# Patient Record
Sex: FEMALE | Race: WHITE | NOT HISPANIC OR LATINO | Employment: FULL TIME | URBAN - NONMETROPOLITAN AREA
[De-identification: names, ages, dates, MRNs, and addresses within clinical notes are randomized per-mention and may not be internally consistent; named-entity substitution may affect disease eponyms.]

---

## 2017-08-03 ENCOUNTER — HISTORY (OUTPATIENT)
Dept: FAMILY MEDICINE | Facility: OTHER | Age: 37
End: 2017-08-03

## 2017-08-03 ENCOUNTER — OFFICE VISIT - GICH (OUTPATIENT)
Dept: FAMILY MEDICINE | Facility: OTHER | Age: 37
End: 2017-08-03

## 2017-08-03 DIAGNOSIS — N20.0 CALCULUS OF KIDNEY: ICD-10-CM

## 2017-08-03 DIAGNOSIS — Z83.49 FAMILY HISTORY OF OTHER ENDOCRINE, NUTRITIONAL AND METABOLIC DISEASES (CODE): ICD-10-CM

## 2017-08-03 DIAGNOSIS — L30.9 DERMATITIS: ICD-10-CM

## 2017-08-03 DIAGNOSIS — Z00.00 ENCOUNTER FOR GENERAL ADULT MEDICAL EXAMINATION WITHOUT ABNORMAL FINDINGS: ICD-10-CM

## 2017-08-03 DIAGNOSIS — Z83.3 FAMILY HISTORY OF DIABETES MELLITUS: ICD-10-CM

## 2017-08-04 ENCOUNTER — AMBULATORY - GICH (OUTPATIENT)
Dept: LAB | Facility: OTHER | Age: 37
End: 2017-08-04

## 2017-08-04 DIAGNOSIS — Z00.00 ENCOUNTER FOR GENERAL ADULT MEDICAL EXAMINATION WITHOUT ABNORMAL FINDINGS: ICD-10-CM

## 2017-08-04 DIAGNOSIS — Z83.49 FAMILY HISTORY OF OTHER ENDOCRINE, NUTRITIONAL AND METABOLIC DISEASES (CODE): ICD-10-CM

## 2017-08-04 DIAGNOSIS — Z83.3 FAMILY HISTORY OF DIABETES MELLITUS: ICD-10-CM

## 2017-08-04 LAB
ABSOLUTE BASOPHILS - HISTORICAL: 0.1 THOU/CU MM
ABSOLUTE EOSINOPHILS - HISTORICAL: 0.1 THOU/CU MM
ABSOLUTE IMMATURE GRANULOCYTES(METAS,MYELOS,PROS) - HISTORICAL: 0 THOU/CU MM
ABSOLUTE LYMPHOCYTES - HISTORICAL: 1.4 THOU/CU MM (ref 0.9–2.9)
ABSOLUTE MONOCYTES - HISTORICAL: 0.7 THOU/CU MM
ABSOLUTE NEUTROPHILS - HISTORICAL: 5.6 THOU/CU MM (ref 1.7–7)
BASOPHILS # BLD AUTO: 0.6 %
CHOL/HDL RATIO - HISTORICAL: 3.22
CHOLESTEROL TOTAL: 161 MG/DL
EOSINOPHIL NFR BLD AUTO: 1.2 %
ERYTHROCYTE [DISTWIDTH] IN BLOOD BY AUTOMATED COUNT: 12.1 % (ref 11.5–15.5)
GLUCOSE SERPL-MCNC: 90 MG/DL (ref 70–105)
HCT VFR BLD AUTO: 38.7 % (ref 33–51)
HDLC SERPL-MCNC: 50 MG/DL (ref 23–92)
HEMOGLOBIN: 13.2 G/DL (ref 12–16)
IMMATURE GRANULOCYTES(METAS,MYELOS,PROS) - HISTORICAL: 0.3 %
LDLC SERPL CALC-MCNC: 97 MG/DL
LYMPHOCYTES NFR BLD AUTO: 17.5 % (ref 20–44)
MCH RBC QN AUTO: 31.4 PG (ref 26–34)
MCHC RBC AUTO-ENTMCNC: 34.1 G/DL (ref 32–36)
MCV RBC AUTO: 92 FL (ref 80–100)
MONOCYTES NFR BLD AUTO: 8.9 %
NEUTROPHILS NFR BLD AUTO: 71.5 % (ref 42–72)
NON-HDL CHOLESTEROL - HISTORICAL: 111 MG/DL
PATIENT STATUS - HISTORICAL: NORMAL
PLATELET # BLD AUTO: 189 THOU/CU MM (ref 140–440)
PMV BLD: 9.4 FL (ref 6.5–11)
RED BLOOD COUNT - HISTORICAL: 4.2 MIL/CU MM (ref 4–5.2)
TRIGL SERPL-MCNC: 71 MG/DL
WHITE BLOOD COUNT - HISTORICAL: 7.8 THOU/CU MM (ref 4.5–11)

## 2017-08-14 LAB — HPV RESULTS - HISTORICAL: NEGATIVE

## 2017-08-28 ENCOUNTER — OFFICE VISIT - GICH (OUTPATIENT)
Dept: FAMILY MEDICINE | Facility: OTHER | Age: 37
End: 2017-08-28

## 2017-08-28 DIAGNOSIS — Z12.4 ENCOUNTER FOR SCREENING FOR MALIGNANT NEOPLASM OF CERVIX: ICD-10-CM

## 2017-12-28 NOTE — PROGRESS NOTES
Patient Information     Patient Name MRN Sex Pari Calhoun [id number] Female 1980      Progress Notes by Aurora Osman MD at 2017 11:30 AM     Author:  Aurora Osman MD Service:  (none) Author Type:  Physician     Filed:  2017 12:38 PM Encounter Date:  2017 Status:  Signed     :  Aurora Osman MD (Physician)            Nursing Notes:   Nataliia Byrd  2017 12:00 PM  Unsigned  Pap ONLY - no charge -    Nataliia Byrd LPN........................2017  12:00 PM       Patient is here for Pap only. She recently had Pap smear unfortunately unsatisfactory specimen. HPV came back negative. He denies any changes she is a G2 para 2002 LMP no 2017.    LMP 2017 (Approximate)   General Appearance: Normal., Pleasant, alert, appropriate appearance for age. No acute distress,     Genitourinary Exam Female:   External Genitalia: normal hair distribution, EG/BUS  Vaginal exam: normal pink mucosa without prolapse or lesions  Cervix: normal appearance without lesions or other abnormalities  Pap obtained after copious amounts of mucous obtained.       1. Screening for cervical cancer   discussed with patient need for repeat pap   Briefly reviewed previous normal pap     Results for orders placed or performed in visit on 17      LIPID PANEL      Result  Value Ref Range    CHOLESTEROL,TOTAL 161 <200 mg/dL    TRIGLYCERIDES 71 <150 mg/dL    HDL CHOLESTEROL 50 23 - 92 mg/dL    NON-HDL CHOLESTEROL 111 <145 mg/dl    CHOL/HDL RATIO            3.22 <4.50                    LDL CHOLESTEROL 97 <100 mg/dL    PATIENT STATUS            FASTING                   GLUCOSE, FASTING      Result  Value Ref Range    GLUCOSE 90 70 - 105 mg/dL   CBC WITH AUTO DIFFERENTIAL      Result  Value Ref Range    WHITE BLOOD COUNT         7.8 4.5 - 11.0 thou/cu mm    RED BLOOD COUNT           4.20 4.00 - 5.20 mil/cu mm    HEMOGLOBIN                13.2 12.0 - 16.0 g/dL     HEMATOCRIT                38.7 33.0 - 51.0 %    MCV                       92 80 - 100 fL    MCH                       31.4 26.0 - 34.0 pg    MCHC                      34.1 32.0 - 36.0 g/dL    RDW                       12.1 11.5 - 15.5 %    PLATELET COUNT            189 140 - 440 thou/cu mm    MPV                       9.4 6.5 - 11.0 fL    NEUTROPHILS               71.5 42.0 - 72.0 %    LYMPHOCYTES               17.5 (L) 20.0 - 44.0 %    MONOCYTES                 8.9 <12.0 %    EOSINOPHILS               1.2 <8.0 %    BASOPHILS                 0.6 <3.0 %    IMMATURE GRANULOCYTES(METAS,MYELOS,PROS) 0.3 %    ABSOLUTE NEUTROPHILS      5.6 1.7 - 7.0 thou/cu mm    ABSOLUTE LYMPHOCYTES      1.4 0.9 - 2.9 thou/cu mm    ABSOLUTE MONOCYTES        0.7 <0.9 thou/cu mm    ABSOLUTE EOSINOPHILS      0.1 <0.5 thou/cu mm    ABSOLUTE BASOPHILS        0.1 <0.3 thou/cu mm    ABSOLUTE IMMATURE GRANULOCYTES(METAS,MYELOS,PROS) 0.0 <=0.3 thou/cu mm       - GYN THIN PREP PAP SCREEN IMAGED; Future

## 2017-12-28 NOTE — PATIENT INSTRUCTIONS
Patient Information     Patient Name MRN Sex Pari Calhoun [id number] Female 1980      Patient Instructions by Aurora Osman MD at 8/3/2017  2:21 PM     Author:  Aurora Osman MD  Service:  (none) Author Type:  Physician     Filed:  8/3/2017  7:43 PM  Encounter Date:  8/3/2017 Status:  Addendum     :  Aurroa Osman MD (Physician)        Related Notes: Original Note by Aurora Osman MD (Physician) filed at 8/3/2017  2:54 PM            Car Safety tips:   Wear your seatbelt at all times.   Do not drive when tired.  If drinking alcohol, find a designated .  Do NOT EVER text and drive!   Consider [employer] marium for your phone.       Labs will be mailed to your home.   Work on following  a mediterranean diet; Use monosaturated fats ( olive , canola and peanut   oil; nuts, avocados), abundance of plant foods which are minimally processed, fish (salmon).  Exercise 30 minutes every day     Try to get 7-8 hours sleep each night.  Rest is important!      Recommendations for females ages 18-40 years old:    Maintaining a few health-related habits can have a huge impact on your future health. Please consider the following general health recommendations:     Eat a quality diet (generally, low in simple sugars, starches, cholesterol and saturated fat.)    If your diet contains less than 4 servings of dairy products each day, take a Calcium 600mg/Vit D 200IU tablet twice daily to help maintain your bone strength.    If you are considering pregnancy begin a supplemental vitamin with 1 mg of folic acid daily.  Avoid alcohol when you think you might have conceived, as disability related to the fetal alcohol syndrome can occur with as few as 1 to 2 drinks if consumed at a critical time in your baby's development.    Never smoke. Avoid chewing tobacco.    Limit alcohol to no more than 1-2 in 24 hours, as higher use increases your weight, can damage your liver or  pancreas, and increases triglycerides (fat in the blood). Never drink and drive.    Avoid the use of recreational drugs.  Methamphetamine, for example, often causes addiction with the first use.    Exercise regularly.  Ideally you would have 30-60 minutes of aerobic exercise at least 4 times weekly.  Find something you enjoy and a friend to do it with you.    Maintain ideal weight.  Body mass index is 20.43 kg/(m^2).  Generally a BMI of 20-25 is considered ideal. Overweight is defined as 25-30, Obese is 30-35 and markedly obese is greater than 35.      Apply sun block (SPF 25 or greater) on exposed skin anytime you are out in the sun to prevent skin cancer.      Wear a seatbelt whenever you are in a car.    You should have a tetanus booster at least every 10 years.  Consider a flu shot every fall.    Immunization History     Administered  Date(s) Administered     Hepatitis A (Adult) 01/02/2012, 08/17/2012     Td (Age >=7 Years) 06/10/2002     Tdap 03/12/2012       Your cholesterol should be checked annually if high, and once every 5 years if normal.    You should have a pap  every 2 years between ages 21-30 and every 3 years between the ages of 30 and 70 unless you have had previous abnormal pap smear, (in these cases the exams and PAP's should be done yearly). If you have had hysterectomy in the past, your future Pap plan may be different.     Consider testing for sexually transmitted disease (STD) if you have had more than 2 partners in the last 5 years or have any other reason to believe you are at risk of infection.  The best way to minimize your risk of STD is to limit the number of partners you have and know their sexual history. Using condoms decreases your risk, but does not always prevent STD.      Index Related topics   Dermatitis: Atopic (Eczema)   ________________________________________________________________________  KEY POINTS    Dermatitis is irritation and swelling of the skin. Atopic means that  you have inherited the risk for allergic skin conditions. Atopic dermatitis may also be called eczema.    You may need ointment for the skin or medicine by mouth to treat the itching or inflammation.    Avoid the things that you know will make your skin rash worse, such as wearing tight or scratchy clothing, or certain foods or medicines.  ________________________________________________________________________  What is atopic dermatitis?  Dermatitis is irritation and swelling (inflammation) of the skin. Atopic means that you have inherited the risk for allergic conditions such as skin rashes, hay fever, and asthma.  Atopic dermatitis may also be called eczema. Eczema is a common skin problem that usually starts in childhood or early adulthood. It s a chronic disease, which means you will likely have it all of your life.  What is the cause?  The immune system is your body s defense against infection. When you have atopic dermatitis, your immune system may react when you are exposed to certain things such as:    Allergies, for example to a food or medicine    Hot baths or showers    Soap    Scratchy or tight clothing    Quick temperature or humidity changes    Stress  Eczema may always be present or may flare-up only in certain seasons. It often gets worse in the winter when indoor air can be very dry.  An allergy to dust mites may make eczema worse. Dust mites are very tiny bugs that you cannot see without a microscope. They live in mattresses, pillows, carpet, and upholstered furniture.  What are the symptoms?  Most people with eczema have a mild form. Symptoms may include:    Itching    Dryness    Fine scales or flaking    Redness  Any area of skin may be affected, but the most common areas are:    Behind the knees    The inside of the elbows    On the side of the neck    Around the eyes and ears  Severe eczema causes intense itching. The skin is usually very sensitive to being touched. Even a light touch, like  when your hair blows across your face, may cause itching. Many people who have severe eczema are quite sensitive to scratchy fabrics, especially wool.  How is it diagnosed?  Your healthcare provider will ask about your symptoms and medical history and examine your skin.  Your provider may recommend testing for an allergy to dust mites.  How is it treated?  Treatment cannot cure eczema, but it can help lessen your symptoms and prevent eczema from getting worse. It may help to use a moisturizer. Or it may go away if you put 1% hydrocortisone cream on the area up to 4 times a day. No prescription is needed for this cream.  For more severe eczema, your healthcare provider may prescribe:    Cream or ointment to stop the itching and other symptoms    Antihistamine pills to help stop itching and any allergic reaction. Do not put antihistamine creams or lotions on your skin if you are taking antihistamine pills.    Anti-inflammatory medicine, such as prednisone, if your symptoms are severe  If tests show that you are allergic to dust mites, your provider may recommend that you try to get rid of any dust mites in your home. In some cases, allergy shots for dust mites may be helpful.  Your healthcare provider will recommend how often you should bathe or shower and which soaps and moisturizers you should use. This helps keep your skin from getting too dry.  How can I take care of myself?  Follow the full course of treatment prescribed by your healthcare provider. In addition:    Avoid scratching your skin even though it itches. Scratching may break the skin and cause infection.    Take short baths or warm showers no more than once a day. Use a mild moisturizing soap or nonsoap cleanser. Avoid long, hot baths. Hot water can increase itching. Pat your skin dry with a soft towel. Do not rub your skin dry. Use moisturizing cream or lotion when your skin is still a little wet.    Use unscented moisturizing creams or ointments,  rather than water-based lotions. Moisturize your skin regularly, several times a day, if possible. Ask your healthcare provider or pharmacist for recommendations.    Avoid the things that you know will make your skin rash worse, such as wearing tight or scratchy clothing, or certain foods or medicines.    Choose soft cotton fabrics for your clothing.    Avoid humidity and sudden changes in temperature when possible.    Avoid getting too hot (over-heating).    Wash clothes and bedding in mild soap and hot water. Rinse your laundry twice to get rid of all the soap. Avoid fabric softener liquids and dryer sheets.    Try to get rid of dust mites in your home. Use anti-allergy covers on bed pillows and mattresses. Wash bedding every week or two. You may want to get rid of [id number] carpets and draperies that catch dust and cannot be damp-wiped or laundered.    Learn to manage stress. Ask for help at home and work when the load is too great to handle. Find ways to relax. For example take up a hobby, listen to music, watch movies, or take walks. Try yoga, meditation, or deep breathing exercises when you feel stressed.  Ask your provider:    How long it will take to recover    If there are activities you should avoid and when you can return to your normal activities    How to take care of yourself at home    What symptoms or problems you should watch for and what to do if you have them  Make sure you know when you should come back for a checkup. Keep all appointments for provider visits or tests.  You can get more information from:    National Eczema Association  237.250.7397  http://nationaleczema.org  Developed by [employer].  Adult Advisor 2016.3 published by [employer].  Last modified: 2016-05-03  Last reviewed: 2015-08-31  This content is reviewed periodically and is subject to change as new health information becomes available. The information is intended to inform and educate and is not a replacement for medical  evaluation, advice, diagnosis or treatment by a healthcare professional.  References   Adult Advisor 2016.3 Index    Copyright   2016 [employer], a division of [employer] All rights reserved.

## 2017-12-28 NOTE — PROGRESS NOTES
Patient Information     Patient Name MRN Sex     Pari Reyes [id number] Female 1980      Progress Notes by Aurora Osman MD at 8/3/2017  1:00 PM     Author:  Aurora Osman MD Service:  (none) Author Type:  Physician     Filed:  8/3/2017  7:45 PM Encounter Date:  8/3/2017 Status:  Signed     :  Aurora Osman MD (Physician)             There are no exam notes on file for this visit.  SUBJECTIVE:    Pari Reyes is a 36 y.o. female who presents for annual physical examination.     HPI: Patient is a  2 Para 2002 both vaginal deliveries 19 and 16 years ago.  Here for annual GYN examination Patient's last menstrual period was 2017 (approximate)..  Periods are normal.   She is in a monogamous relationship.  Declines sexually transmitted disease testing.  No vaginal complaints.  No breast complaints.   No family history of breast or ovarian cancer.   Last pap .  No history of abnormal pap     Family history diabetes/ high cholesterol  patient has not had her blood sugar cholesterol check. Her dad had high cholesterol and diabetes. He was type II diabetic and had insulin later in life.  She denies any excessive thirst hunger or urination no chest pain or palpitations no changes in skin or hair no nausea vomiting diarrhea.      Wt Readings from Last 3 Encounters:    17 60.5 kg (133 lb 6.4 oz)   13 61.7 kg (136 lb 2 oz)   12 62.6 kg (138 lb)          ALLERGIES:  Review of patient's allergies indicates no known allergies.     Immunization History:  Immunization History     Administered  Date(s) Administered     Hepatitis A (Adult) 2012, 2012     Td (Age >=7 Years) 06/10/2002     Tdap 2012        CURRENT MEDICATIONS:   Current Outpatient Prescriptions       Medication  Sig Dispense Refill     triamcinolone (ARISTOCORT; KENALOG) 0.1 % cream Apply  topically to affected area(s) 3 times daily. 30 g 1 Tube 0     No current  facility-administered medications for this visit.      Medications have been reviewed by me and are current to the best of my knowledge and ability.    PROBLEM LIST:  Patient Active Problem List     Diagnosis  Code     ACNE VULGARIS L70.8       PAST MEDICAL HISTORY:  Past Medical History:     Diagnosis  Date     Nephrolithiasis 2007    passed spontaneously, calcium stone       (spontaneous vaginal delivery)     x2      SURGICAL HISTORY:  Past Surgical History:      Procedure  Laterality Date     ESWL      Nephrolithiasis, subsequent stent removal       History    Smoking Status      Never Smoker   Smokeless Tobacco      Never Used       SOCIAL HISTORY:  Social History     Social History        Marital status:       Spouse name: Nico      Number of children:  2     Years of education:  N/A     Occupational History      Not on file.     Social History Main Topics         Smoking status:   Never Smoker     Smokeless tobacco:   Never Used     Alcohol use   No     Drug use:   No     Sexual activity:   Yes     Partners:  Male     Birth control/ protection:  Surgical      Comment: ; vas      Other Topics  Concern      Service No     Blood Transfusions No     Caffeine Concern No     Occupational Exposure No     Hobby Hazards No     Sleep Concern No     Stress Concern No     Weight Concern No     Special Diet No     Back Care No     Exercise No     Bike Helmet No     Seat Belt Yes     Self-Exams Yes     Social History Narrative     Patient is  with two daughters at home.     Working at [employer].      employed at [email or web address].        Lopze Pedro Father    Owen Vasquez Mother    Nico Reyes Spouse    Ling Reyes Daughter    Mel Reyes Daughter        Preload  2013             FAMILY HISTORY:  Family History       Problem   Relation Age of Onset     Hypertension  Mother      Hyperlipidemia  Mother      hyperlipidemia       Cancer  Mother 49     intraductal carcinoma  "in situ        Other  Mother      osteopenia       Diabetes  Father      mid 50s       Heart Disease  Father 58     mild MI       Hypertension  Maternal Grandfather      Diabetes  Maternal Grandfather      Other  Maternal Grandfather      dementia       Other  Maternal Grandfather      gout       Osteoporosis  Other      maternal great aunt         REVIEW OF SYSTEMS:    ROS: No TIA's or unusual headaches, no dysphagia.  No prolonged cough. No dyspnea or chest pain on exertion.  No abdominal pain, change in bowel habits, black or bloody stools.  No urinary tract symptoms.  No new or unusual musculoskeletal symptoms.  Normal menses, no abnormal vaginal bleeding, discharge or unexpected pelvic pain. No new breast lumps, breast pain or nipple discharge.      EXAM:   /60  Pulse 72  Ht 1.721 m (5' 7.75\")  Wt 60.5 kg (133 lb 6.4 oz)  LMP 07/22/2017 (Approximate)  BMI 20.43 kg/m2       General Appearance: Normal., Pleasant, alert, appropriate appearance for age. No acute distress,  Psych-alert, oriented, and appropriate affect  HEENT-within normal limits   Neck Exam: Normal., Supple, no masses or nodes.  Thyroid Exam: No nodules or enlargement., normal to palpation  Lungs:  Clear to auscultation.  Cardiovascular Exam: Regular rate and rhythm. S1, S2, no murmur, click, gallop, or rubs.  Breast Exam: Normal., No dimpling, nipple retraction or discharge. No masses or nodes. Self breast exam reviewed and taught  Gastrointestinal Exam: Soft, nontender, no abnormal masses or organomegaly.    Genitourinary Exam Female:   External Genitalia: normal hair distribution, EG/BUS  Vaginal exam: normal pink mucosa without prolapse or lesions  Cervix: normal appearance without lesions or other abnormalities;  Pap obtained ; small amount of bleeding.   BME: normal size uterus, no adnexal masses.  Skin: no concerning moles, rashes, or lesions  Extremities:  NT, no edema           ASSESSMENT/PLAN    ICD-10-CM    1. Routine physical " examination Z00.00 GYN THIN PREP PAP SCREEN IMAGED      CBC WITH DIFFERENTIAL      GYN THIN PREP PAP SCREEN IMAGED   2. Eczema, unspecified type L30.9 triamcinolone (ARISTOCORT; KENALOG) 0.1 % cream   3. Family history of diabetes mellitus Z83.3 CBC WITH DIFFERENTIAL   4. Family history of hyperlipidemia Z83.49 LIPID PANEL      GLUCOSE, FASTING   5. Nephrolithiasis N20.0      Ms. Amy's Body mass index is 20.43 kg/(m^2). This is within the normal range for a 36 y.o. Normal range for ages 18+ is between 18.5 and 24.9.         Patient Instructions     Car Safety tips:   Wear your seatbelt at all times.   Do not drive when tired.  If drinking alcohol, find a designated .  Do NOT EVER text and drive!   Consider DRIVE MODE marium for your phone.       Labs will be mailed to your home.   Work on following  a mediterranean diet; Use monosaturated fats ( olive , canola and peanut   oil; nuts, avocados), abundance of plant foods which are minimally processed, fish (salmon).  Exercise 30 minutes every day     Try to get 7-8 hours sleep each night.  Rest is important!      Recommendations for females ages 18-40 years old:    Maintaining a few health-related habits can have a huge impact on your future health. Please consider the following general health recommendations:     Eat a quality diet (generally, low in simple sugars, starches, cholesterol and saturated fat.)    If your diet contains less than 4 servings of dairy products each day, take a Calcium 600mg/Vit D 200IU tablet twice daily to help maintain your bone strength.    If you are considering pregnancy begin a supplemental vitamin with 1 mg of folic acid daily.  Avoid alcohol when you think you might have conceived, as disability related to the fetal alcohol syndrome can occur with as few as 1 to 2 drinks if consumed at a critical time in your baby's development.    Never smoke. Avoid chewing tobacco.    Limit alcohol to no more than 1-2 in 24 hours, as higher  use increases your weight, can damage your liver or pancreas, and increases triglycerides (fat in the blood). Never drink and drive.    Avoid the use of recreational drugs.  Methamphetamine, for example, often causes addiction with the first use.    Exercise regularly.  Ideally you would have 30-60 minutes of aerobic exercise at least 4 times weekly.  Find something you enjoy and a friend to do it with you.    Maintain ideal weight.  Body mass index is 20.43 kg/(m^2).  Generally a BMI of 20-25 is considered ideal. Overweight is defined as 25-30, Obese is 30-35 and markedly obese is greater than 35.      Apply sun block (SPF 25 or greater) on exposed skin anytime you are out in the sun to prevent skin cancer.      Wear a seatbelt whenever you are in a car.    You should have a tetanus booster at least every 10 years.  Consider a flu shot every fall.    Immunization History     Administered  Date(s) Administered     Hepatitis A (Adult) 01/02/2012, 08/17/2012     Td (Age >=7 Years) 06/10/2002     Tdap 03/12/2012       Your cholesterol should be checked annually if high, and once every 5 years if normal.    You should have a pap  every 2 years between ages 21-30 and every 3 years between the ages of 30 and 70 unless you have had previous abnormal pap smear, (in these cases the exams and PAP's should be done yearly). If you have had hysterectomy in the past, your future Pap plan may be different.     Consider testing for sexually transmitted disease (STD) if you have had more than 2 partners in the last 5 years or have any other reason to believe you are at risk of infection.  The best way to minimize your risk of STD is to limit the number of partners you have and know their sexual history. Using condoms decreases your risk, but does not always prevent STD.      Index Related topics   Dermatitis: Atopic (Eczema)   ________________________________________________________________________  KEY POINTS    Dermatitis is  irritation and swelling of the skin. Atopic means that you have inherited the risk for allergic skin conditions. Atopic dermatitis may also be called eczema.    You may need ointment for the skin or medicine by mouth to treat the itching or inflammation.    Avoid the things that you know will make your skin rash worse, such as wearing tight or scratchy clothing, or certain foods or medicines.  ________________________________________________________________________  What is atopic dermatitis?  Dermatitis is irritation and swelling (inflammation) of the skin. Atopic means that you have inherited the risk for allergic conditions such as skin rashes, hay fever, and asthma.  Atopic dermatitis may also be called eczema. Eczema is a common skin problem that usually starts in childhood or early adulthood. It s a chronic disease, which means you will likely have it all of your life.  What is the cause?  The immune system is your body s defense against infection. When you have atopic dermatitis, your immune system may react when you are exposed to certain things such as:    Allergies, for example to a food or medicine    Hot baths or showers    Soap    Scratchy or tight clothing    Quick temperature or humidity changes    Stress  Eczema may always be present or may flare-up only in certain seasons. It often gets worse in the winter when indoor air can be very dry.  An allergy to dust mites may make eczema worse. Dust mites are very tiny bugs that you cannot see without a microscope. They live in mattresses, pillows, carpet, and upholstered furniture.  What are the symptoms?  Most people with eczema have a mild form. Symptoms may include:    Itching    Dryness    Fine scales or flaking    Redness  Any area of skin may be affected, but the most common areas are:    Behind the knees    The inside of the elbows    On the side of the neck    Around the eyes and ears  Severe eczema causes intense itching. The skin is usually very  sensitive to being touched. Even a light touch, like when your hair blows across your face, may cause itching. Many people who have severe eczema are quite sensitive to scratchy fabrics, especially wool.  How is it diagnosed?  Your healthcare provider will ask about your symptoms and medical history and examine your skin.  Your provider may recommend testing for an allergy to dust mites.  How is it treated?  Treatment cannot cure eczema, but it can help lessen your symptoms and prevent eczema from getting worse. It may help to use a moisturizer. Or it may go away if you put 1% hydrocortisone cream on the area up to 4 times a day. No prescription is needed for this cream.  For more severe eczema, your healthcare provider may prescribe:    Cream or ointment to stop the itching and other symptoms    Antihistamine pills to help stop itching and any allergic reaction. Do not put antihistamine creams or lotions on your skin if you are taking antihistamine pills.    Anti-inflammatory medicine, such as prednisone, if your symptoms are severe  If tests show that you are allergic to dust mites, your provider may recommend that you try to get rid of any dust mites in your home. In some cases, allergy shots for dust mites may be helpful.  Your healthcare provider will recommend how often you should bathe or shower and which soaps and moisturizers you should use. This helps keep your skin from getting too dry.  How can I take care of myself?  Follow the full course of treatment prescribed by your healthcare provider. In addition:    Avoid scratching your skin even though it itches. Scratching may break the skin and cause infection.    Take short baths or warm showers no more than once a day. Use a mild moisturizing soap or nonsoap cleanser. Avoid long, hot baths. Hot water can increase itching. Pat your skin dry with a soft towel. Do not rub your skin dry. Use moisturizing cream or lotion when your skin is still a little  wet.    Use unscented moisturizing creams or ointments, rather than water-based lotions. Moisturize your skin regularly, several times a day, if possible. Ask your healthcare provider or pharmacist for recommendations.    Avoid the things that you know will make your skin rash worse, such as wearing tight or scratchy clothing, or certain foods or medicines.    Choose soft cotton fabrics for your clothing.    Avoid humidity and sudden changes in temperature when possible.    Avoid getting too hot (over-heating).    Wash clothes and bedding in mild soap and hot water. Rinse your laundry twice to get rid of all the soap. Avoid fabric softener liquids and dryer sheets.    Try to get rid of dust mites in your home. Use anti-allergy covers on bed pillows and mattresses. Wash bedding every week or two. You may want to get rid of [id number] carpets and draperies that catch dust and cannot be damp-wiped or laundered.    Learn to manage stress. Ask for help at home and work when the load is too great to handle. Find ways to relax. For example take up a hobby, listen to music, watch movies, or take walks. Try yoga, meditation, or deep breathing exercises when you feel stressed.  Ask your provider:    How long it will take to recover    If there are activities you should avoid and when you can return to your normal activities    How to take care of yourself at home    What symptoms or problems you should watch for and what to do if you have them  Make sure you know when you should come back for a checkup. Keep all appointments for provider visits or tests.  You can get more information from:    National Eczema Association  716.392.1930  http://nationaleczema.org  Developed by [employer].  Adult Advisor 2016.3 published by [employer].  Last modified: 2016-05-03  Last reviewed: 2015-08-31  This content is reviewed periodically and is subject to change as new health information becomes available. The information is intended to  inform and educate and is not a replacement for medical evaluation, advice, diagnosis or treatment by a healthcare professional.  References   Adult Advisor 2016.3 Index    Copyright   2016 [employer], a division of [employer] All rights reserved.

## 2017-12-28 NOTE — ADDENDUM NOTE
Patient Information     Patient Name MRN Pari Freire [id number] Female 1980      Addendum Note by Destiny Esquivel at 8/10/2017  8:47 AM     Author:  Destiny Esquivel Service:  (none) Author Type:  (none)     Filed:  8/10/2017  8:47 AM Encounter Date:  8/3/2017 Status:  Signed     :  Destiny Esquivel       Addended by: DESTINY ESQUIVEL on: 8/10/2017 08:47 AM        Modules accepted: Orders

## 2017-12-29 NOTE — PATIENT INSTRUCTIONS
Patient Information     Patient Name MRN Sex Pari Calhoun [id number] Female 1980      Patient Instructions by Aurora Osman MD at 2017 11:30 AM     Author:  Aurora Osman MD Service:  (none) Author Type:  Physician     Filed:  2017  9:19 AM Encounter Date:  2017 Status:  Signed     :  Aurora Osman MD (Physician)            Car Safety tips:   Wear your seatbelt at all times.   Do not drive when tired.  If drinking alcohol, find a designated .  Do NOT EVER text and drive!   Consider DRIVE MODE marium for your phone.       Results for orders placed or performed in visit on 17      LIPID PANEL      Result  Value Ref Range    CHOLESTEROL,TOTAL 161 <200 mg/dL    TRIGLYCERIDES 71 <150 mg/dL    HDL CHOLESTEROL 50 23 - 92 mg/dL    NON-HDL CHOLESTEROL 111 <145 mg/dl    CHOL/HDL RATIO            3.22 <4.50                    LDL CHOLESTEROL 97 <100 mg/dL    PATIENT STATUS            FASTING                   GLUCOSE, FASTING      Result  Value Ref Range    GLUCOSE 90 70 - 105 mg/dL   CBC WITH AUTO DIFFERENTIAL      Result  Value Ref Range    WHITE BLOOD COUNT         7.8 4.5 - 11.0 thou/cu mm    RED BLOOD COUNT           4.20 4.00 - 5.20 mil/cu mm    HEMOGLOBIN                13.2 12.0 - 16.0 g/dL    HEMATOCRIT                38.7 33.0 - 51.0 %    MCV                       92 80 - 100 fL    MCH                       31.4 26.0 - 34.0 pg    MCHC                      34.1 32.0 - 36.0 g/dL    RDW                       12.1 11.5 - 15.5 %    PLATELET COUNT            189 140 - 440 thou/cu mm    MPV                       9.4 6.5 - 11.0 fL    NEUTROPHILS               71.5 42.0 - 72.0 %    LYMPHOCYTES               17.5 (L) 20.0 - 44.0 %    MONOCYTES                 8.9 <12.0 %    EOSINOPHILS               1.2 <8.0 %    BASOPHILS                 0.6 <3.0 %    IMMATURE GRANULOCYTES(METAS,MYELOS,PROS) 0.3 %    ABSOLUTE NEUTROPHILS      5.6 1.7 - 7.0 thou/cu  mm    ABSOLUTE LYMPHOCYTES      1.4 0.9 - 2.9 thou/cu mm    ABSOLUTE MONOCYTES        0.7 <0.9 thou/cu mm    ABSOLUTE EOSINOPHILS      0.1 <0.5 thou/cu mm    ABSOLUTE BASOPHILS        0.1 <0.3 thou/cu mm    ABSOLUTE IMMATURE GRANULOCYTES(METAS,MYELOS,PROS) 0.0 <=0.3 thou/cu mm

## 2017-12-30 NOTE — NURSING NOTE
Patient Information     Patient Name MRN Sex Pari Calhoun [id number] Female 1980      Nursing Note by Nataliia Byrd at 2017 11:30 AM     Author:  Nataliia Byrd Service:  (none) Author Type:  (none)     Filed:  2017 12:38 PM Encounter Date:  2017 Status:  Signed     :  Nataliia Byrd            Pap ONLY - no charge -    Nataliia Byrd LPN........................2017  12:00 PM

## 2018-01-25 VITALS
WEIGHT: 133.4 LBS | DIASTOLIC BLOOD PRESSURE: 60 MMHG | HEIGHT: 68 IN | HEART RATE: 72 BPM | BODY MASS INDEX: 20.22 KG/M2 | SYSTOLIC BLOOD PRESSURE: 108 MMHG

## 2018-02-01 ENCOUNTER — DOCUMENTATION ONLY (OUTPATIENT)
Dept: FAMILY MEDICINE | Facility: OTHER | Age: 38
End: 2018-02-01

## 2018-02-01 PROBLEM — L70.8 OTHER ACNE: Status: ACTIVE | Noted: 2018-02-01

## 2018-02-01 RX ORDER — TRIAMCINOLONE ACETONIDE 1 MG/G
CREAM TOPICAL
COMMUNITY
Start: 2017-08-03 | End: 2024-05-02

## 2018-07-23 NOTE — PROGRESS NOTES
Patient Information     Patient Name  Pari Reyes MRN  [id number] Sex  Female   1980      Letter by Aurora Osman MD at      Author:  Aurora Osman MD Service:  (none) Author Type:  (none)    Filed:   Encounter Date:  2017 Status:  (Other)           Pari Reyes  [address] [facility] [address]          August 10, 2017    Dear Ms. Reyes:    Following are the tests completed during your last clinic visit.  The results of these tests are normal and require no further attention unless otherwise noted.  Unfortunately, the pathology department read the pap smear as an 'inadequate ' specimen as likely due to mucous did not contain enough cells.  Therefore, at your convenience and no charge to you, other than pathology test, you will need to return to clinic for a pap only test.      Results for orders placed or performed in visit on 17      LIPID PANEL      Result  Value Ref Range    CHOLESTEROL,TOTAL 161 <200 mg/dL    TRIGLYCERIDES 71 <150 mg/dL    HDL CHOLESTEROL 50 23 - 92 mg/dL    NON-HDL CHOLESTEROL 111 <145 mg/dl    CHOL/HDL RATIO            3.22 <4.50                    LDL CHOLESTEROL 97 <100 mg/dL    PATIENT STATUS            FASTING                   GLUCOSE, FASTING      Result  Value Ref Range    GLUCOSE 90 70 - 105 mg/dL   CBC WITH AUTO DIFFERENTIAL      Result  Value Ref Range    WHITE BLOOD COUNT         7.8 4.5 - 11.0 thou/cu mm    RED BLOOD COUNT           4.20 4.00 - 5.20 mil/cu mm    HEMOGLOBIN                13.2 12.0 - 16.0 g/dL    HEMATOCRIT                38.7 33.0 - 51.0 %    MCV                       92 80 - 100 fL    MCH                       31.4 26.0 - 34.0 pg    MCHC                      34.1 32.0 - 36.0 g/dL    RDW                       12.1 11.5 - 15.5 %    PLATELET COUNT            189 140 - 440 thou/cu mm    MPV                       9.4 6.5 - 11.0 fL    NEUTROPHILS               71.5 42.0 - 72.0 %    LYMPHOCYTES               17.5 (L) 20.0  - 44.0 %    MONOCYTES                 8.9 <12.0 %    EOSINOPHILS               1.2 <8.0 %    BASOPHILS                 0.6 <3.0 %    IMMATURE GRANULOCYTES(METAS,MYELOS,PROS) 0.3 %    ABSOLUTE NEUTROPHILS      5.6 1.7 - 7.0 thou/cu mm    ABSOLUTE LYMPHOCYTES      1.4 0.9 - 2.9 thou/cu mm    ABSOLUTE MONOCYTES        0.7 <0.9 thou/cu mm    ABSOLUTE EOSINOPHILS      0.1 <0.5 thou/cu mm    ABSOLUTE BASOPHILS        0.1 <0.3 thou/cu mm    ABSOLUTE IMMATURE GRANULOCYTES(METAS,MYELOS,PROS) 0.0 <=0.3 thou/cu mm          If you have any further questions or problems contact my office at the above number.  I trust this finds you in good health and spirits.      Sincerely,         Electronically signed by Aurora Osman MD

## 2018-07-23 NOTE — PROGRESS NOTES
Patient Information     Patient Name  Pari Reyes MRN  [id number] Sex  Female   1980      Letter by Aurora Osman MD at      Author:  Aurora Osman MD Service:  (none) Author Type:  (none)    Filed:   Encounter Date:  2017 Status:  (Other)           Pari Reyes  [address] [facility] [address]          2017    Dear Ms. Reyes:    The result from the Pap test(s) you had done at your recent clinic visit came back as normal.     We recommend that you have an adult physical exam each year. Depending on your Pap test history, you may or may not need a Pap test at these visits.  You and your health care provider will decide what is right for you.    If you have any further questions or concerns, please call [phone]. You may also contact us by using medical messaging if you have MyChart.    Thank you for choosing [facility] to participate in your healthcare needs.    Sincerely,    Dr. Osman        The [place] Screening Program is a statewide comprehensive breast and cervical cancer screening program that provides free screening and follow-up services (including colposcopy) to uninsured and underinsured women. For more information call toll free [phone] ([phone])

## 2019-08-23 ENCOUNTER — OFFICE VISIT (OUTPATIENT)
Dept: FAMILY MEDICINE | Facility: OTHER | Age: 39
End: 2019-08-23
Attending: NURSE PRACTITIONER
Payer: COMMERCIAL

## 2019-08-23 VITALS
RESPIRATION RATE: 16 BRPM | SYSTOLIC BLOOD PRESSURE: 100 MMHG | DIASTOLIC BLOOD PRESSURE: 58 MMHG | TEMPERATURE: 98.2 F | HEART RATE: 84 BPM | BODY MASS INDEX: 19.55 KG/M2 | HEIGHT: 68 IN | WEIGHT: 129 LBS

## 2019-08-23 DIAGNOSIS — Z00.00 ROUTINE GENERAL MEDICAL EXAMINATION AT A HEALTH CARE FACILITY: Primary | ICD-10-CM

## 2019-08-23 LAB
ANION GAP SERPL CALCULATED.3IONS-SCNC: 4 MMOL/L (ref 3–14)
BUN SERPL-MCNC: 17 MG/DL (ref 7–25)
CALCIUM SERPL-MCNC: 9 MG/DL (ref 8.6–10.3)
CHLORIDE SERPL-SCNC: 105 MMOL/L (ref 98–107)
CHOLEST SERPL-MCNC: 160 MG/DL
CO2 SERPL-SCNC: 27 MMOL/L (ref 21–31)
CREAT SERPL-MCNC: 0.9 MG/DL (ref 0.6–1.2)
ERYTHROCYTE [DISTWIDTH] IN BLOOD BY AUTOMATED COUNT: 12.1 % (ref 10–15)
GFR SERPL CREATININE-BSD FRML MDRD: 69 ML/MIN/{1.73_M2}
GLUCOSE SERPL-MCNC: 96 MG/DL (ref 70–105)
HCT VFR BLD AUTO: 42.2 % (ref 35–47)
HDLC SERPL-MCNC: 44 MG/DL (ref 23–92)
HGB BLD-MCNC: 13.9 G/DL (ref 11.7–15.7)
LDLC SERPL CALC-MCNC: 105 MG/DL
MCH RBC QN AUTO: 29.9 PG (ref 26.5–33)
MCHC RBC AUTO-ENTMCNC: 32.9 G/DL (ref 31.5–36.5)
MCV RBC AUTO: 91 FL (ref 78–100)
NONHDLC SERPL-MCNC: 116 MG/DL
PLATELET # BLD AUTO: 204 10E9/L (ref 150–450)
POTASSIUM SERPL-SCNC: 4.3 MMOL/L (ref 3.5–5.1)
RBC # BLD AUTO: 4.65 10E12/L (ref 3.8–5.2)
SODIUM SERPL-SCNC: 136 MMOL/L (ref 134–144)
TRIGL SERPL-MCNC: 56 MG/DL
TSH SERPL DL<=0.05 MIU/L-ACNC: 2.17 IU/ML (ref 0.34–5.6)
WBC # BLD AUTO: 4.8 10E9/L (ref 4–11)

## 2019-08-23 PROCEDURE — 84443 ASSAY THYROID STIM HORMONE: CPT | Mod: ZL | Performed by: NURSE PRACTITIONER

## 2019-08-23 PROCEDURE — 99214 OFFICE O/P EST MOD 30 MIN: CPT | Performed by: NURSE PRACTITIONER

## 2019-08-23 PROCEDURE — 80061 LIPID PANEL: CPT | Mod: ZL | Performed by: NURSE PRACTITIONER

## 2019-08-23 PROCEDURE — 36415 COLL VENOUS BLD VENIPUNCTURE: CPT | Mod: ZL | Performed by: NURSE PRACTITIONER

## 2019-08-23 PROCEDURE — 85027 COMPLETE CBC AUTOMATED: CPT | Mod: ZL | Performed by: NURSE PRACTITIONER

## 2019-08-23 PROCEDURE — 80048 BASIC METABOLIC PNL TOTAL CA: CPT | Mod: ZL | Performed by: NURSE PRACTITIONER

## 2019-08-23 ASSESSMENT — PAIN SCALES - GENERAL: PAINLEVEL: NO PAIN (0)

## 2019-08-23 ASSESSMENT — MIFFLIN-ST. JEOR: SCORE: 1298.64

## 2019-08-23 NOTE — PROGRESS NOTES
SUBJECTIVE:   CC: Pari Reyes is an 39 year old woman who presents for preventive health visit.     Healthy Habits:    Do you get at least three servings of calcium containing foods daily (dairy, green leafy vegetables, etc.)? yes    Amount of exercise or daily activities, outside of work: no exercise, trying to work it in a little    Problems taking medications regularly not applicable    Medication side effects: N/A    Have you had an eye exam in the past two years? Has been about 2 years    Do you see a dentist twice per year? Usually once a year    Do you have sleep apnea, excessive snoring or daytime drowsiness?no      Today's PHQ-2 Score:   PHQ-2 ( 1999 Pfizer) 8/23/2019   Q1: Little interest or pleasure in doing things 0   Q2: Feeling down, depressed or hopeless 0   PHQ-2 Score 0       Abuse: Current or Past(Physical, Sexual or Emotional)- No  Do you feel safe in your environment? Yes    Social History     Tobacco Use     Smoking status: Never Smoker     Smokeless tobacco: Never Used   Substance Use Topics     Alcohol use: Yes     Comment: Rare     If you drink alcohol do you typically have >3 drinks per day or >7 drinks per week? No                     Reviewed orders with patient.  Reviewed health maintenance and updated orders accordingly - Yes  Labs reviewed in EPIC  BP Readings from Last 3 Encounters:   08/23/19 100/58   08/03/17 108/60   08/22/13 108/68    Wt Readings from Last 3 Encounters:   08/23/19 58.5 kg (129 lb)   08/03/17 60.5 kg (133 lb 6.4 oz)   08/22/13 61.7 kg (136 lb 2 oz)                  Patient Active Problem List   Diagnosis     Other acne     Nephrolithiasis     Past Surgical History:   Procedure Laterality Date     OTHER SURGICAL HISTORY      2001,600000,OTHER,Nephrolithiasis, subsequent stent removal       Social History     Tobacco Use     Smoking status: Never Smoker     Smokeless tobacco: Never Used   Substance Use Topics     Alcohol use: Yes     Comment: Rare     Family  History   Problem Relation Age of Onset     Anxiety Disorder Daughter      No Known Problems Daughter      Hypertension Mother         Hypertension     Hyperlipidemia Mother         Hyperlipidemia,hyperlipidemia     Cancer Mother 49        Cancer,intraductal carcinoma in situ     Other - See Comments Mother         osteopenia     Diabetes Father         Diabetes,mid 50s     Heart Disease Father 58        Heart Disease,mild MI     Hypertension Maternal Grandfather         Hypertension     Diabetes Maternal Grandfather         Diabetes     Other - See Comments Maternal Grandfather         dementia/gout     Osteoporosis Other         Osteoporosis,maternal great aunt     Hypertension Maternal Grandmother      Mental Illness Paternal Grandmother      Cerebrovascular Disease Paternal Grandmother      Coronary Artery Disease Maternal Uncle          Current Outpatient Medications   Medication Sig Dispense Refill     triamcinolone (KENALOG) 0.1 % cream        No Known Allergies    Mammo discussed, not appropriate for or declined by this patient.    Pertinent mammograms are reviewed under the imaging tab.  History of abnormal Pap smear: NO - age 30-65 PAP every 5 years with negative HPV co-testing recommended     Reviewed and updated as needed this visit by clinical staff  Tobacco  Allergies  Meds  Problems  Med Hx  Surg Hx  Fam Hx  Soc Hx          Reviewed and updated as needed this visit by Provider  Tobacco  Allergies  Meds  Problems  Med Hx  Surg Hx  Fam Hx  Soc Hx             ROS:  CONSTITUTIONAL: NEGATIVE for fever, chills, change in weight  INTEGUMENTARU/SKIN: NEGATIVE for worrisome rashes, moles or lesions  EYES: NEGATIVE for vision changes or irritation  ENT: NEGATIVE for ear, mouth and throat problems  RESP: NEGATIVE for significant cough or SOB  BREAST: NEGATIVE for masses, tenderness or discharge  CV: NEGATIVE for chest pain, palpitations or peripheral edema  GI: NEGATIVE for nausea, abdominal pain,  "heartburn, or change in bowel habits  : NEGATIVE for unusual urinary or vaginal symptoms. Periods are regular.  MUSCULOSKELETAL: NEGATIVE for significant arthralgias or myalgia  NEURO: NEGATIVE for weakness, dizziness or paresthesias  ENDOCRINE: NEGATIVE for temperature intolerance, skin/hair changes  HEME/ALLERGY/IMMUNE: NEGATIVE for bleeding problems  PSYCHIATRIC: NEGATIVE for changes in mood or affect    OBJECTIVE:   /58 (BP Location: Right arm, Patient Position: Sitting, Cuff Size: Adult Regular)   Pulse 84   Temp 98.2  F (36.8  C) (Tympanic)   Resp 16   Ht 1.727 m (5' 8\")   Wt 58.5 kg (129 lb)   LMP 07/30/2019 (Exact Date)   Breastfeeding? No   BMI 19.61 kg/m    EXAM:  GENERAL: healthy, alert and no distress  EYES: Eyes grossly normal to inspection, PERRL and conjunctivae and sclerae normal  HENT: ear canals and TM's normal, nose and mouth without ulcers or lesions  NECK: no adenopathy, no asymmetry, masses, or scars and thyroid normal to palpation  RESP: lungs clear to auscultation - no rales, rhonchi or wheezes  CV: regular rate and rhythm, normal S1 S2, no S3 or S4, no murmur, click or rub, no peripheral edema and peripheral pulses strong  ABDOMEN: soft, nontender, no hepatosplenomegaly, no masses and bowel sounds normal  MS: no gross musculoskeletal defects noted, no edema  SKIN: acne scarring to face  NEURO: Normal strength and tone, mentation intact and speech normal  PSYCH: mentation appears normal, affect normal/bright    Diagnostic Test Results:  Labs reviewed in Epic  Results for orders placed or performed in visit on 08/23/19   Lipid Profile   Result Value Ref Range    Cholesterol 160 <200 mg/dL    Triglycerides 56 <150 mg/dL    HDL Cholesterol 44 23 - 92 mg/dL    LDL Cholesterol Calculated 105 (H) <100 mg/dL    Non HDL Cholesterol 116 <130 mg/dL   CBC W PLT No Diff   Result Value Ref Range    WBC 4.8 4.0 - 11.0 10e9/L    RBC Count 4.65 3.8 - 5.2 10e12/L    Hemoglobin 13.9 11.7 - 15.7 " "g/dL    Hematocrit 42.2 35.0 - 47.0 %    MCV 91 78 - 100 fl    MCH 29.9 26.5 - 33.0 pg    MCHC 32.9 31.5 - 36.5 g/dL    RDW 12.1 10.0 - 15.0 %    Platelet Count 204 150 - 450 10e9/L   Basic Metabolic Panel   Result Value Ref Range    Sodium 136 134 - 144 mmol/L    Potassium 4.3 3.5 - 5.1 mmol/L    Chloride 105 98 - 107 mmol/L    Carbon Dioxide 27 21 - 31 mmol/L    Anion Gap 4 3 - 14 mmol/L    Glucose 96 70 - 105 mg/dL    Urea Nitrogen 17 7 - 25 mg/dL    Creatinine 0.90 0.60 - 1.20 mg/dL    GFR Estimate 69 >60 mL/min/[1.73_m2]    GFR Estimate If Black 84 >60 mL/min/[1.73_m2]    Calcium 9.0 8.6 - 10.3 mg/dL   Thyrotropin GH   Result Value Ref Range    Thyrotropin 2.17 0.34 - 5.60 IU/mL       ASSESSMENT/PLAN:   1. Routine general medical examination at a health care facility  No concerns, screening labs normal as above. Return to clinic in 1 year for routine physical and labs.   - Thyrotropin GH; Future  - Basic Metabolic Panel; Future  - CBC W PLT No Diff; Future  - Lipid Profile; Future    COUNSELING:   Reviewed preventive health counseling, as reflected in patient instructions       Regular exercise       Healthy diet/nutrition       Vision screening       Osteoporosis Prevention/Bone Health       Consider Hep C screening for patients born between 1945 and  - declined       HIV screeninx in teen years, 1x in adult years, and at intervals if high risk - declined    Estimated body mass index is 19.61 kg/m  as calculated from the following:    Height as of this encounter: 1.727 m (5' 8\").    Weight as of this encounter: 58.5 kg (129 lb).         reports that she has never smoked. She has never used smokeless tobacco.      Counseling Resources:  ATP IV Guidelines  Pooled Cohorts Equation Calculator  Breast Cancer Risk Calculator  FRAX Risk Assessment  ICSI Preventive Guidelines  Dietary Guidelines for Americans,   USDA's MyPlate  ASA Prophylaxis  Lung CA Screening    Fabiana Nicholas NP  [facility] " [facility]

## 2019-08-23 NOTE — LETTER
August 23, 2019      Pari Reyes  [address] [place] [address]        Dear ,    We are writing to inform you of your test results.    Your test results fall within the expected range(s) or remain unchanged from previous results.  Please continue with current treatment plan.    Resulted Orders   Lipid Profile   Result Value Ref Range    Cholesterol 160 <200 mg/dL    Triglycerides 56 <150 mg/dL    HDL Cholesterol 44 23 - 92 mg/dL    LDL Cholesterol Calculated 105 (H) <100 mg/dL      Comment:      Above desirable:  100-129 mg/dl  Borderline High:  130-159 mg/dL  High:             160-189 mg/dL  Very high:       >189 mg/dl      Non HDL Cholesterol 116 <130 mg/dL   CBC W PLT No Diff   Result Value Ref Range    WBC 4.8 4.0 - 11.0 10e9/L    RBC Count 4.65 3.8 - 5.2 10e12/L    Hemoglobin 13.9 11.7 - 15.7 g/dL    Hematocrit 42.2 35.0 - 47.0 %    MCV 91 78 - 100 fl    MCH 29.9 26.5 - 33.0 pg    MCHC 32.9 31.5 - 36.5 g/dL    RDW 12.1 10.0 - 15.0 %    Platelet Count 204 150 - 450 10e9/L   Basic Metabolic Panel   Result Value Ref Range    Sodium 136 134 - 144 mmol/L    Potassium 4.3 3.5 - 5.1 mmol/L    Chloride 105 98 - 107 mmol/L    Carbon Dioxide 27 21 - 31 mmol/L    Anion Gap 4 3 - 14 mmol/L    Glucose 96 70 - 105 mg/dL    Urea Nitrogen 17 7 - 25 mg/dL    Creatinine 0.90 0.60 - 1.20 mg/dL    GFR Estimate 69 >60 mL/min/[1.73_m2]    GFR Estimate If Black 84 >60 mL/min/[1.73_m2]    Calcium 9.0 8.6 - 10.3 mg/dL   Thyrotropin GH   Result Value Ref Range    Thyrotropin 2.17 0.34 - 5.60 IU/mL       If you have any questions or concerns, please call the clinic at the number listed above.       Sincerely,        Fabiana Nicholas NP

## 2019-08-23 NOTE — NURSING NOTE
"Patient presents to the clinic today for a physical.  Dede Swift LPN 8/23/2019   8:51 AM    Chief Complaint   Patient presents with     Physical       Initial /58 (BP Location: Right arm, Patient Position: Sitting, Cuff Size: Adult Regular)   Pulse 84   Temp 98.2  F (36.8  C) (Tympanic)   Resp 16   Ht 1.727 m (5' 8\")   Wt 58.5 kg (129 lb)   LMP 07/30/2019 (Exact Date)   Breastfeeding? No   BMI 19.61 kg/m   Estimated body mass index is 19.61 kg/m  as calculated from the following:    Height as of this encounter: 1.727 m (5' 8\").    Weight as of this encounter: 58.5 kg (129 lb).  Medication Reconciliation: complete    "

## 2020-03-11 ENCOUNTER — HEALTH MAINTENANCE LETTER (OUTPATIENT)
Age: 40
End: 2020-03-11

## 2020-12-27 ENCOUNTER — HEALTH MAINTENANCE LETTER (OUTPATIENT)
Age: 40
End: 2020-12-27

## 2021-04-25 ENCOUNTER — HEALTH MAINTENANCE LETTER (OUTPATIENT)
Age: 41
End: 2021-04-25

## 2021-09-25 ENCOUNTER — ALLIED HEALTH/NURSE VISIT (OUTPATIENT)
Dept: FAMILY MEDICINE | Facility: OTHER | Age: 41
End: 2021-09-25
Attending: FAMILY MEDICINE
Payer: COMMERCIAL

## 2021-09-25 DIAGNOSIS — R05.9 COUGH: Primary | ICD-10-CM

## 2021-09-25 PROCEDURE — C9803 HOPD COVID-19 SPEC COLLECT: HCPCS

## 2021-09-25 PROCEDURE — U0005 INFEC AGEN DETEC AMPLI PROBE: HCPCS | Mod: ZL

## 2021-09-27 LAB — SARS-COV-2 RNA RESP QL NAA+PROBE: POSITIVE

## 2021-10-09 ENCOUNTER — HEALTH MAINTENANCE LETTER (OUTPATIENT)
Age: 41
End: 2021-10-09

## 2022-05-21 ENCOUNTER — HEALTH MAINTENANCE LETTER (OUTPATIENT)
Age: 42
End: 2022-05-21

## 2022-09-17 ENCOUNTER — HEALTH MAINTENANCE LETTER (OUTPATIENT)
Age: 42
End: 2022-09-17

## 2023-02-20 ENCOUNTER — OFFICE VISIT (OUTPATIENT)
Dept: FAMILY MEDICINE | Facility: OTHER | Age: 43
End: 2023-02-20
Attending: STUDENT IN AN ORGANIZED HEALTH CARE EDUCATION/TRAINING PROGRAM
Payer: COMMERCIAL

## 2023-02-20 VITALS
HEART RATE: 80 BPM | TEMPERATURE: 97.2 F | DIASTOLIC BLOOD PRESSURE: 64 MMHG | RESPIRATION RATE: 16 BRPM | SYSTOLIC BLOOD PRESSURE: 118 MMHG | WEIGHT: 130.5 LBS | OXYGEN SATURATION: 100 % | HEIGHT: 68 IN | BODY MASS INDEX: 19.78 KG/M2

## 2023-02-20 DIAGNOSIS — Z00.00 ROUTINE GENERAL MEDICAL EXAMINATION AT A HEALTH CARE FACILITY: Primary | ICD-10-CM

## 2023-02-20 LAB
ANION GAP SERPL CALCULATED.3IONS-SCNC: 7 MMOL/L (ref 7–15)
BASOPHILS # BLD AUTO: 0.1 10E3/UL (ref 0–0.2)
BASOPHILS NFR BLD AUTO: 1 %
BUN SERPL-MCNC: 14.8 MG/DL (ref 6–20)
CALCIUM SERPL-MCNC: 8.7 MG/DL (ref 8.6–10)
CHLORIDE SERPL-SCNC: 105 MMOL/L (ref 98–107)
CHOLEST SERPL-MCNC: 165 MG/DL
CREAT SERPL-MCNC: 0.84 MG/DL (ref 0.51–0.95)
DEPRECATED HCO3 PLAS-SCNC: 26 MMOL/L (ref 22–29)
EOSINOPHIL # BLD AUTO: 0.1 10E3/UL (ref 0–0.7)
EOSINOPHIL NFR BLD AUTO: 2 %
ERYTHROCYTE [DISTWIDTH] IN BLOOD BY AUTOMATED COUNT: 12.2 % (ref 10–15)
GFR SERPL CREATININE-BSD FRML MDRD: 87 ML/MIN/1.73M2
GLUCOSE SERPL-MCNC: 101 MG/DL (ref 70–99)
HCT VFR BLD AUTO: 41 % (ref 35–47)
HDLC SERPL-MCNC: 58 MG/DL
HGB BLD-MCNC: 13.7 G/DL (ref 11.7–15.7)
HOLD SPECIMEN: NORMAL
IMM GRANULOCYTES # BLD: 0 10E3/UL
IMM GRANULOCYTES NFR BLD: 0 %
LDLC SERPL CALC-MCNC: 98 MG/DL
LYMPHOCYTES # BLD AUTO: 1.4 10E3/UL (ref 0.8–5.3)
LYMPHOCYTES NFR BLD AUTO: 24 %
MCH RBC QN AUTO: 30.2 PG (ref 26.5–33)
MCHC RBC AUTO-ENTMCNC: 33.4 G/DL (ref 31.5–36.5)
MCV RBC AUTO: 91 FL (ref 78–100)
MONOCYTES # BLD AUTO: 0.7 10E3/UL (ref 0–1.3)
MONOCYTES NFR BLD AUTO: 12 %
NEUTROPHILS # BLD AUTO: 3.5 10E3/UL (ref 1.6–8.3)
NEUTROPHILS NFR BLD AUTO: 61 %
NONHDLC SERPL-MCNC: 107 MG/DL
NRBC # BLD AUTO: 0 10E3/UL
NRBC BLD AUTO-RTO: 0 /100
PLATELET # BLD AUTO: 230 10E3/UL (ref 150–450)
POTASSIUM SERPL-SCNC: 4.4 MMOL/L (ref 3.4–5.3)
RBC # BLD AUTO: 4.53 10E6/UL (ref 3.8–5.2)
SODIUM SERPL-SCNC: 138 MMOL/L (ref 136–145)
TRIGL SERPL-MCNC: 45 MG/DL
WBC # BLD AUTO: 5.7 10E3/UL (ref 4–11)

## 2023-02-20 PROCEDURE — 80061 LIPID PANEL: CPT | Mod: ZL | Performed by: STUDENT IN AN ORGANIZED HEALTH CARE EDUCATION/TRAINING PROGRAM

## 2023-02-20 PROCEDURE — 87624 HPV HI-RISK TYP POOLED RSLT: CPT | Mod: ZL | Performed by: STUDENT IN AN ORGANIZED HEALTH CARE EDUCATION/TRAINING PROGRAM

## 2023-02-20 PROCEDURE — 99396 PREV VISIT EST AGE 40-64: CPT | Mod: 25 | Performed by: STUDENT IN AN ORGANIZED HEALTH CARE EDUCATION/TRAINING PROGRAM

## 2023-02-20 PROCEDURE — G0123 SCREEN CERV/VAG THIN LAYER: HCPCS | Performed by: STUDENT IN AN ORGANIZED HEALTH CARE EDUCATION/TRAINING PROGRAM

## 2023-02-20 PROCEDURE — 85004 AUTOMATED DIFF WBC COUNT: CPT | Mod: ZL | Performed by: STUDENT IN AN ORGANIZED HEALTH CARE EDUCATION/TRAINING PROGRAM

## 2023-02-20 PROCEDURE — 90715 TDAP VACCINE 7 YRS/> IM: CPT | Performed by: STUDENT IN AN ORGANIZED HEALTH CARE EDUCATION/TRAINING PROGRAM

## 2023-02-20 PROCEDURE — 36415 COLL VENOUS BLD VENIPUNCTURE: CPT | Mod: ZL | Performed by: STUDENT IN AN ORGANIZED HEALTH CARE EDUCATION/TRAINING PROGRAM

## 2023-02-20 PROCEDURE — 80048 BASIC METABOLIC PNL TOTAL CA: CPT | Mod: ZL | Performed by: STUDENT IN AN ORGANIZED HEALTH CARE EDUCATION/TRAINING PROGRAM

## 2023-02-20 PROCEDURE — 90471 IMMUNIZATION ADMIN: CPT | Performed by: STUDENT IN AN ORGANIZED HEALTH CARE EDUCATION/TRAINING PROGRAM

## 2023-02-20 ASSESSMENT — ANXIETY QUESTIONNAIRES
6. BECOMING EASILY ANNOYED OR IRRITABLE: NOT AT ALL
2. NOT BEING ABLE TO STOP OR CONTROL WORRYING: NOT AT ALL
4. TROUBLE RELAXING: NOT AT ALL
5. BEING SO RESTLESS THAT IT IS HARD TO SIT STILL: NOT AT ALL
7. FEELING AFRAID AS IF SOMETHING AWFUL MIGHT HAPPEN: NOT AT ALL
GAD7 TOTAL SCORE: 0
1. FEELING NERVOUS, ANXIOUS, OR ON EDGE: NOT AT ALL
GAD7 TOTAL SCORE: 0
GAD7 TOTAL SCORE: 0
3. WORRYING TOO MUCH ABOUT DIFFERENT THINGS: NOT AT ALL
7. FEELING AFRAID AS IF SOMETHING AWFUL MIGHT HAPPEN: NOT AT ALL

## 2023-02-20 ASSESSMENT — ENCOUNTER SYMPTOMS
DIARRHEA: 0
JOINT SWELLING: 0
WEAKNESS: 0
CHILLS: 0
HEMATURIA: 0
SHORTNESS OF BREATH: 0
EYE PAIN: 0
FREQUENCY: 0
SORE THROAT: 0
DYSURIA: 0
BREAST MASS: 0
ABDOMINAL PAIN: 0
HEADACHES: 0
NERVOUS/ANXIOUS: 0
HEARTBURN: 0
CONSTIPATION: 0
MYALGIAS: 0
ARTHRALGIAS: 0
DIZZINESS: 0
FEVER: 0
HEMATOCHEZIA: 0
NAUSEA: 0
PARESTHESIAS: 0
COUGH: 0
PALPITATIONS: 0

## 2023-02-20 ASSESSMENT — PATIENT HEALTH QUESTIONNAIRE - PHQ9
SUM OF ALL RESPONSES TO PHQ QUESTIONS 1-9: 0
SUM OF ALL RESPONSES TO PHQ QUESTIONS 1-9: 0

## 2023-02-20 NOTE — PROGRESS NOTES
SUBJECTIVE:   CC: Pari is an 42 year old who presents for preventive health visit.   Patient has been advised of split billing requirements and indicates understanding: Yes  Healthy Habits:   PHQ-2 Total Score: 0    Contraception: vasectomy  Last pap: 8/28/2017--normal, HPV negative  Any hx of abnormal paps:  no  FH of early CA?:mom--bile duct cancer  Cholesterol/DM concerns/screening: no  Tobacco?: no  Alcohol: minimal  Drug use: no  Exercise: no  Calcium intake: yes  DEXA:n/a  Last mammo: none  Colon CA screening: not yet due.   Immunizations: flu, covid, Tdap     Today's PHQ-2 Score:   PHQ-2 ( 1999 Pfizer) 8/23/2019   Q1: Little interest or pleasure in doing things 0   Q2: Feeling down, depressed or hopeless 0   PHQ-2 Score 0   PHQ-2 Total Score (12-17 Years)- Positive if 3 or more points; Administer PHQ-A if positive 0       Have you ever done Advance Care Planning? (For example, a Health Directive, POLST, or a discussion with a medical provider or your loved ones about your wishes): No, advance care planning information given to patient to review.  Patient plans to discuss their wishes with loved ones or provider.      Social History     Tobacco Use     Smoking status: Never     Smokeless tobacco: Never   Substance Use Topics     Alcohol use: Yes     Comment: Rare       No flowsheet data found.    Reviewed orders with patient.  Reviewed health maintenance and updated orders accordingly - Yes  Lab work is in process    Breast Cancer Screening:  Any new diagnosis of family breast, ovarian, or bowel cancer? No    FHS-7: No flowsheet data found.    Mammogram Screening - Offered annual screening and updated Health Maintenance for mutual plan based on risk factor consideration    Pertinent mammograms are reviewed under the imaging tab.    History of abnormal Pap smear: NO - age 30-65 PAP every 5 years with negative HPV co-testing recommended     Reviewed and updated as needed this visit by clinical staff   Tobacco  " Allergies  Meds              Reviewed and updated as needed this visit by Provider                 Past Medical History:   Diagnosis Date     Back strain      Calculus of kidney     ,passed spontaneously, calcium stone     Encounter for full-term uncomplicated delivery     x2      Past Surgical History:   Procedure Laterality Date     OTHER SURGICAL HISTORY      ,OTHER,Nephrolithiasis, subsequent stent removal     OB History    Para Term  AB Living   2 0 0 0 0 2   SAB IAB Ectopic Multiple Live Births   0 0 0 0 0       Review of Systems  CONSTITUTIONAL: NEGATIVE for fever, chills, change in weight  INTEGUMENTARU/SKIN: NEGATIVE for worrisome rashes, moles or lesions  EYES: NEGATIVE for vision changes or irritation  ENT: NEGATIVE for ear, mouth and throat problems  RESP: NEGATIVE for significant cough or SOB  BREAST: NEGATIVE for masses, tenderness or discharge  CV: NEGATIVE for chest pain, palpitations or peripheral edema  GI: NEGATIVE for nausea, abdominal pain, heartburn, or change in bowel habits  : NEGATIVE for unusual urinary or vaginal symptoms. Periods are regular.  MUSCULOSKELETAL: NEGATIVE for significant arthralgias or myalgia  NEURO: NEGATIVE for weakness, dizziness or paresthesias  PSYCHIATRIC: NEGATIVE for changes in mood or affect     OBJECTIVE:   /64 (BP Location: Right arm, Patient Position: Sitting, Cuff Size: Adult Regular)   Pulse 80   Temp 97.2  F (36.2  C) (Tympanic)   Resp 16   Ht 1.734 m (5' 8.25\")   Wt 59.2 kg (130 lb 8 oz)   LMP  (Approximate)   SpO2 100%   BMI 19.70 kg/m    Physical Exam  GENERAL: healthy, alert and no distress  EYES: Eyes grossly normal to inspection, PERRL and conjunctivae and sclerae normal  HENT: ear canals and TM's normal, nose and mouth without ulcers or lesions  NECK: no adenopathy, no asymmetry, masses, or scars and thyroid normal to palpation  RESP: lungs clear to auscultation - no rales, rhonchi or wheezes  BREAST: " normal without masses, tenderness or nipple discharge and no palpable axillary masses or adenopathy  CV: regular rate and rhythm, normal S1 S2, no S3 or S4, no murmur, click or rub, no peripheral edema and peripheral pulses strong  ABDOMEN: soft, nontender, no hepatosplenomegaly, no masses and bowel sounds normal   (female): normal female external genitalia, normal urethral meatus, vaginal mucosa, normal cervix/adnexa/uterus without masses or discharge  MS: no gross musculoskeletal defects noted, no edema  SKIN: scattered pink-flesh colored macules on mid back, upper mid back flesh colored papuel  PSYCH: mentation appears normal, affect normal/bright    Diagnostic Test Results:  Labs reviewed in Epic    ASSESSMENT/PLAN:   Pari was seen today for physical.    Diagnoses and all orders for this visit:    Routine general medical examination at a health care facility  -     Lipid Profile; Future  -     PAP screen with HPV - recommended age 30 - 65 years  -     MA Screen Bilateral w/[place]; Future  -     Lipid Profile    Other orders  -     GH IMM - TDAP (ADACEL, BOOSTRIX)  -     Extra Tube; Future  -     Extra Tube        Patient has been advised of split billing requirements and indicates understanding: Yes      COUNSELING:  Reviewed preventive health counseling, as reflected in patient instructions       Regular exercise       Healthy diet/nutrition       Immunizations    Vaccinated for: TDAP             Family planning       Osteoporosis prevention/bone health       Colorectal Cancer Screening       Consider Hep C screening for all patients one time for ages 18-79 years       HIV screeninx in teen years, 1x in adult years, and at intervals if high risk        She reports that she has never smoked. She has never used smokeless tobacco.      Jarod Pascual MD  [facility]  Answers for HPI/ROS submitted by the patient on 2023  PHQ9 TOTAL SCORE: 0  KATLYN 7 TOTAL SCORE: 0

## 2023-02-20 NOTE — NURSING NOTE
Patient presents to clinic for physical exam.    Medication Rec Complete  Elin Epstein LPN............2/20/2023 7:57 AM

## 2023-02-22 LAB
BKR LAB AP GYN ADEQUACY: NORMAL
BKR LAB AP GYN INTERPRETATION: NORMAL
BKR LAB AP HPV REFLEX: NORMAL
BKR LAB AP PREVIOUS ABNORMAL: NORMAL
PATH REPORT.COMMENTS IMP SPEC: NORMAL
PATH REPORT.COMMENTS IMP SPEC: NORMAL
PATH REPORT.RELEVANT HX SPEC: NORMAL

## 2023-02-23 ENCOUNTER — HOSPITAL ENCOUNTER (OUTPATIENT)
Dept: MAMMOGRAPHY | Facility: OTHER | Age: 43
Discharge: HOME | End: 2023-02-23
Attending: STUDENT IN AN ORGANIZED HEALTH CARE EDUCATION/TRAINING PROGRAM | Admitting: STUDENT IN AN ORGANIZED HEALTH CARE EDUCATION/TRAINING PROGRAM
Payer: COMMERCIAL

## 2023-02-23 DIAGNOSIS — Z00.00 ROUTINE GENERAL MEDICAL EXAMINATION AT A HEALTH CARE FACILITY: ICD-10-CM

## 2023-02-23 DIAGNOSIS — Z12.31 VISIT FOR SCREENING MAMMOGRAM: ICD-10-CM

## 2023-02-23 PROCEDURE — 77067 SCR MAMMO BI INCL CAD: CPT

## 2023-02-24 LAB
HUMAN PAPILLOMA VIRUS 16 DNA: NEGATIVE
HUMAN PAPILLOMA VIRUS 18 DNA: NEGATIVE
HUMAN PAPILLOMA VIRUS FINAL DIAGNOSIS: NORMAL
HUMAN PAPILLOMA VIRUS OTHER HR: NEGATIVE

## 2023-06-29 ENCOUNTER — TELEPHONE (OUTPATIENT)
Dept: OBGYN | Facility: OTHER | Age: 43
End: 2023-06-29
Payer: COMMERCIAL

## 2023-06-29 NOTE — TELEPHONE ENCOUNTER
Patient called and states her periods are lasting 2 weeks and she has clots and gushing of blood. Please contact patient.    Karen Oneal on 6/29/2023 at 8:57 AM

## 2023-06-29 NOTE — TELEPHONE ENCOUNTER
Patient call returned. She called today with concern of heavy and prolonged bleeding. She reports she had her period for about 2 weeks, then starting Sunday night she had a large amount of bleeding with clots. This has happened every night since, late in the evening. Bleeding during the day is very minimal. Up until her previous cycle, she got periods regularly every month, lasting 6-7 days. Her last period also lasted about two weeks. This period was about three weeks late.     Reports  had vasectomy, no chance of pregnancy. No pain or cramping, not feeling weak, dizzy or lightheaded.    Patient will schedule with ZENAIDA Santos CNP for initial work-up. She was advised to present to ER if bleeding worsens or new symptoms appear.    Shyla Centeno RN...................6/29/2023 10:37 AM

## 2023-07-06 ENCOUNTER — OFFICE VISIT (OUTPATIENT)
Dept: OBGYN | Facility: OTHER | Age: 43
End: 2023-07-06
Payer: COMMERCIAL

## 2023-07-06 VITALS
DIASTOLIC BLOOD PRESSURE: 72 MMHG | WEIGHT: 130 LBS | SYSTOLIC BLOOD PRESSURE: 110 MMHG | BODY MASS INDEX: 19.62 KG/M2 | HEART RATE: 76 BPM

## 2023-07-06 DIAGNOSIS — N89.8 VAGINAL DISCHARGE: ICD-10-CM

## 2023-07-06 DIAGNOSIS — B96.89 BACTERIAL VAGINOSIS: ICD-10-CM

## 2023-07-06 DIAGNOSIS — N76.0 BACTERIAL VAGINOSIS: ICD-10-CM

## 2023-07-06 DIAGNOSIS — N92.1 MENORRHAGIA WITH IRREGULAR CYCLE: ICD-10-CM

## 2023-07-06 DIAGNOSIS — N93.9 ABNORMAL UTERINE BLEEDING (AUB): Primary | ICD-10-CM

## 2023-07-06 LAB
APTT PPP: 26 SECONDS (ref 22–38)
BACTERIAL VAGINOSIS VAG-IMP: POSITIVE
CANDIDA DNA VAG QL NAA+PROBE: NOT DETECTED
CANDIDA GLABRATA / CANDIDA KRUSEI DNA: NOT DETECTED
ERYTHROCYTE [DISTWIDTH] IN BLOOD BY AUTOMATED COUNT: 12.8 % (ref 10–15)
HCT VFR BLD AUTO: 33.7 % (ref 35–47)
HGB BLD-MCNC: 11.2 G/DL (ref 11.7–15.7)
HOLD SPECIMEN: NORMAL
MCH RBC QN AUTO: 30.4 PG (ref 26.5–33)
MCHC RBC AUTO-ENTMCNC: 33.2 G/DL (ref 31.5–36.5)
MCV RBC AUTO: 92 FL (ref 78–100)
PLATELET # BLD AUTO: 157 10E3/UL (ref 150–450)
RBC # BLD AUTO: 3.68 10E6/UL (ref 3.8–5.2)
T VAGINALIS DNA VAG QL NAA+PROBE: NOT DETECTED
WBC # BLD AUTO: 7.1 10E3/UL (ref 4–11)

## 2023-07-06 PROCEDURE — 99214 OFFICE O/P EST MOD 30 MIN: CPT

## 2023-07-06 PROCEDURE — 85730 THROMBOPLASTIN TIME PARTIAL: CPT | Mod: ZL

## 2023-07-06 PROCEDURE — 36415 COLL VENOUS BLD VENIPUNCTURE: CPT | Mod: ZL

## 2023-07-06 PROCEDURE — 85027 COMPLETE CBC AUTOMATED: CPT | Mod: ZL

## 2023-07-06 PROCEDURE — 87801 DETECT AGNT MULT DNA AMPLI: CPT | Mod: ZL

## 2023-07-06 RX ORDER — METRONIDAZOLE 500 MG/1
500 TABLET ORAL 2 TIMES DAILY
Qty: 14 TABLET | Refills: 0 | Status: SHIPPED | OUTPATIENT
Start: 2023-07-06 | End: 2023-07-13

## 2023-07-06 ASSESSMENT — PAIN SCALES - GENERAL: PAINLEVEL: NO PAIN (0)

## 2023-07-06 NOTE — PROGRESS NOTES
CC: Abnormal menstrual periods  HPI:  Pari is a 42 year old female who presents for the above concern.  She notes that at the end of April her menstrual cycle lasted approximately 2 weeks.  She then states in May her cycle was 2 weeks late and lasted a couple weeks again.  She notes that this has been happening continuously and this last cycle t she was having gushing with large clots approximately 3 evenings which was then followed by normal flow and this cycle has now lasted 3.5 weeks.  She does not endorse any severe pain.  She is 42 years old and has no family history of any early menopause.  She endorses 2 vaginal births.  She does not have any blood clotting issues noted.  She is up-to-date on her Pap.  She is not currently on any birth control as her  has a vasectomy.    Patient's last menstrual period was 2023 (exact date).    OB History    Para Term  AB Living   2 0 0 0 0 2   SAB IAB Ectopic Multiple Live Births   0 0 0 0 0     Past Medical History:   Diagnosis Date     Back strain      Calculus of kidney     ,passed spontaneously, calcium stone     Encounter for full-term uncomplicated delivery     x2       Current Outpatient Medications   Medication     triamcinolone (KENALOG) 0.1 % cream     No current facility-administered medications for this visit.     No Known Allergies  /72   Pulse 76   Wt 59 kg (130 lb)   LMP 2023 (Exact Date)   BMI 19.62 kg/m      REVIEW OF SYSTEMS  As Per HPI, Otherwise negative.     Exam:  Constitutional: healthy, alert and no distress  Pelvic: Normal BUSE, vulva appears normal, vagina normal but cervix face is very erythematous, moderate to yellow-colored discharge, MVP obtained. Uterus is slightly larger size, normal position and mobility without adnexal mass or tenderness. Chaperone was present.         Lab: No results found for any visits on 23.    ASSESSMENT/PLAN :  (N93.9) Abnormal uterine bleeding (AUB)  (primary  encounter diagnosis)  (N92.1) Menorrhagia with irregular cycle  Plan: CBC W PLT No Diff, APTT, US Pelvic Complete         with Transvaginal  Today we will plan for CBC to evaluate hemoglobin as well as platelets.  aPTT done to evaluate for clotting disorder.  Discussed with patient that following this we would schedule a pelvic ultrasound to evaluate uterine area.  Following that she will follow-up with gynecology for further assessment.  Discussed some possibilities of this including fibroids, polyp, thickened endometrium, or other issue.  She is in agreement with this plan.  If she notes that she begins to bleed heavily again she will contact the office so that we can provide progesterone 10 mg twice daily x10 days followed by 10 mg daily for 10 days and a taper fashion to assist with bleeding.     (N89.8) Vaginal discharge  Plan: Multiplex Vaginal Panel by PCR        Due to vaginal discharge as well as erythematous cervix and MVP is collected today    Total time spent 30 minutes in pre-visit planning, counseling, face-to-face exam, and documentation      ZENAIDA Santos CNP  8:37 AM 7/6/2023

## 2023-07-06 NOTE — NURSING NOTE
Patient presents to clinic for consult for prolonged periods with heavy bleeding and clotting  /72   Pulse 76   Wt 59 kg (130 lb)   LMP 06/12/2023 (Exact Date)   BMI 19.62 kg/m    Jenni Pena LPN on 7/6/2023 at 8:09 AM

## 2023-07-31 ENCOUNTER — OFFICE VISIT (OUTPATIENT)
Dept: OBGYN | Facility: OTHER | Age: 43
End: 2023-07-31
Attending: OBSTETRICS & GYNECOLOGY
Payer: COMMERCIAL

## 2023-07-31 ENCOUNTER — HOSPITAL ENCOUNTER (OUTPATIENT)
Dept: ULTRASOUND IMAGING | Facility: OTHER | Age: 43
Discharge: HOME | End: 2023-07-31
Payer: COMMERCIAL

## 2023-07-31 VITALS
SYSTOLIC BLOOD PRESSURE: 120 MMHG | DIASTOLIC BLOOD PRESSURE: 70 MMHG | WEIGHT: 127 LBS | BODY MASS INDEX: 19.17 KG/M2 | HEART RATE: 64 BPM

## 2023-07-31 DIAGNOSIS — N92.1 MENORRHAGIA WITH IRREGULAR CYCLE: ICD-10-CM

## 2023-07-31 DIAGNOSIS — N93.9 ABNORMAL UTERINE BLEEDING (AUB): Primary | ICD-10-CM

## 2023-07-31 DIAGNOSIS — N93.9 ABNORMAL UTERINE BLEEDING (AUB): ICD-10-CM

## 2023-07-31 LAB — TSH SERPL DL<=0.005 MIU/L-ACNC: 1.7 UIU/ML (ref 0.3–4.2)

## 2023-07-31 PROCEDURE — 88305 TISSUE EXAM BY PATHOLOGIST: CPT

## 2023-07-31 PROCEDURE — 99213 OFFICE O/P EST LOW 20 MIN: CPT | Mod: 25 | Performed by: OBSTETRICS & GYNECOLOGY

## 2023-07-31 PROCEDURE — 76856 US EXAM PELVIC COMPLETE: CPT

## 2023-07-31 PROCEDURE — 58100 BIOPSY OF UTERUS LINING: CPT | Performed by: OBSTETRICS & GYNECOLOGY

## 2023-07-31 PROCEDURE — 36415 COLL VENOUS BLD VENIPUNCTURE: CPT | Mod: ZL | Performed by: OBSTETRICS & GYNECOLOGY

## 2023-07-31 PROCEDURE — 84443 ASSAY THYROID STIM HORMONE: CPT | Mod: ZL | Performed by: OBSTETRICS & GYNECOLOGY

## 2023-07-31 RX ORDER — MEDROXYPROGESTERONE ACETATE 10 MG
10 TABLET ORAL DAILY
Qty: 10 TABLET | Refills: 0 | Status: SHIPPED | OUTPATIENT
Start: 2023-07-31 | End: 2024-05-02

## 2023-07-31 NOTE — PROGRESS NOTES
CC:abnormal uterine bleeding  HPI:  Pari is a 42 year old female who presents for evaluation of two months of bleeding continuously to the point of becoming anemic from the volume of bleeding. She denies other concerns. Her mother had breast cancer, but no family history of uterine or cervical cancer.    Patient's last menstrual period was 2023 (exact date).    Pap Smears: normal  Mammograms:normal    OB History    Para Term  AB Living   2 2 2 0 0 2   SAB IAB Ectopic Multiple Live Births   0 0 0 0 2      # Outcome Date GA Lbr Ricardo/2nd Weight Sex Delivery Anes PTL Lv   2 Term      Vag-Spont   BUCK   1 Term      Vag-Spont   BUCK     Past Medical History:   Diagnosis Date    Back strain     Calculus of kidney     ,passed spontaneously, calcium stone    Encounter for full-term uncomplicated delivery     x2     Past Surgical History:   Procedure Laterality Date    OTHER SURGICAL HISTORY      ,,OTHER,Nephrolithiasis, subsequent stent removal     Social History     Socioeconomic History    Marital status:      Spouse name: Nico    Number of children: Not on file    Years of education: Not on file    Highest education level: Not on file   Occupational History    Not on file   Tobacco Use    Smoking status: Never    Smokeless tobacco: Never   Vaping Use    Vaping Use: Never used   Substance and Sexual Activity    Alcohol use: Yes     Comment: Rare    Drug use: Never    Sexual activity: Yes     Partners: Male     Birth control/protection: Surgical     Comment: Birth control method: ; vas   Other Topics Concern    Not on file   Social History Narrative    Patient is  with two daughters     Working for public [employer]     --Nico. employed at [employer].    Lopez Vasquez Father    Owen Vasquez Mother    Nico Santosisela Spouse    Ling -- daughter    Mel --daughter     Social Determinants of Health     Financial Resource Strain: Not on file   Food Insecurity: Not on file    Transportation Needs: Not on file   Physical Activity: Not on file   Stress: Not on file   Social Connections: Not on file   Intimate Partner Violence: Not on file   Housing Stability: Not on file     Family History   Problem Relation Age of Onset    Hypertension Mother         Hypertension    Hyperlipidemia Mother         Hyperlipidemia,hyperlipidemia    Other - See Comments Mother         osteopenia    Skin Cancer Mother     Liver Cancer Mother         bile duct    Breast Cancer Mother 49        Cancer,intraductal carcinoma in situ    Diabetes Father         Diabetes,mid 50s    Heart Disease Father 58        Heart Disease,mild MI    Hypertension Maternal Grandmother     Hypertension Maternal Grandfather         Hypertension    Diabetes Maternal Grandfather         Diabetes    Other - See Comments Maternal Grandfather         dementia/gout    Mental Illness Paternal Grandmother     Cerebrovascular Disease Paternal Grandmother     Anxiety Disorder Daughter     No Known Problems Daughter     Coronary Artery Disease Maternal Uncle     Osteoporosis Other         Osteoporosis,maternal great aunt       Current Outpatient Medications   Medication    triamcinolone (KENALOG) 0.1 % cream     No current facility-administered medications for this visit.     No Known Allergies  /70   Pulse 64   Wt 57.6 kg (127 lb)   LMP 06/12/2023 (Exact Date)   BMI 19.17 kg/m      REVIEW OF SYSTEMS  Neg except as above    Exam:  Constitutional: healthy, alert, active, and no distress  Pelvic: Normal BUSE, vulva appears normal, vaginaland cervix are normal.     After consent was obtained, the cervix was prepped with betadine. She sounded to 8 cm and two passes were performed productive of modest amount of tissue with the pipele instrument. Patient tolerated the procedure well.     Lab:   Results for orders placed or performed during the hospital encounter of 07/31/23    Pelvic Complete with Transvaginal     Status: None     Narrative    PROCEDURE: US PELVIC TRANSABDOMINAL AND TRANSVAGINAL 7/31/2023 10:12  AM    HISTORY: Abnormal uterine bleeding (AUB); Menorrhagia with irregular  cycle    COMPARISONS: None.    TECHNIQUE: Transabdominal and endovaginal imaging.    FINDINGS: Uterus measures 9.3 x 4.5 x 4.4 cm. It is somewhat  heterogeneous in echotexture without focal measurable masses.    Endometrial stripe measures 7 mm. It is somewhat heterogeneous but no  focal mass is seen within the endometrial cavity.    Neither ovary is seen. There is no adnexal mass. Bowel is seen. There  is no free fluid in the cul-de-sac.         Impression    IMPRESSION: No uterine or pelvic mass.    NUNO REYES MD         SYSTEM ID:  [id number]       ASSESSMENT/PLAN :  1. Abnormal uterine bleeding (AUB)      Trial of Provera for 10 days. If not better consider OCP's, Mirena IUD, Novasure ablation, or hysterectomy alternatively.    Jovany Bruce MD FACOG  10:40 AM 7/31/2023

## 2023-07-31 NOTE — NURSING NOTE
"Chief Complaint   Patient presents with    Consult     Heavy Menses   PATIENT IS HERE TO DISCUSS HEAVY MENSES, PATIENT HAD ULTRASOUND TODAY.     Initial /70   Pulse 64   Wt 57.6 kg (127 lb)   LMP 06/12/2023 (Exact Date)   BMI 19.17 kg/m   Estimated body mass index is 19.17 kg/m  as calculated from the following:    Height as of 2/20/23: 1.734 m (5' 8.25\").    Weight as of this encounter: 57.6 kg (127 lb).  Medication Reconciliation: complete        Aida Theodore LPN    "

## 2023-08-02 LAB
PATH REPORT.COMMENTS IMP SPEC: NORMAL
PATH REPORT.FINAL DX SPEC: NORMAL
PHOTO IMAGE: NORMAL

## 2024-05-01 SDOH — HEALTH STABILITY: PHYSICAL HEALTH: ON AVERAGE, HOW MANY MINUTES DO YOU ENGAGE IN EXERCISE AT THIS LEVEL?: 30 MIN

## 2024-05-01 SDOH — HEALTH STABILITY: PHYSICAL HEALTH: ON AVERAGE, HOW MANY DAYS PER WEEK DO YOU ENGAGE IN MODERATE TO STRENUOUS EXERCISE (LIKE A BRISK WALK)?: 1 DAY

## 2024-05-01 ASSESSMENT — SOCIAL DETERMINANTS OF HEALTH (SDOH): HOW OFTEN DO YOU GET TOGETHER WITH FRIENDS OR RELATIVES?: TWICE A WEEK

## 2024-05-02 ENCOUNTER — OFFICE VISIT (OUTPATIENT)
Dept: FAMILY MEDICINE | Facility: OTHER | Age: 44
End: 2024-05-02
Attending: PHYSICIAN ASSISTANT
Payer: COMMERCIAL

## 2024-05-02 VITALS
SYSTOLIC BLOOD PRESSURE: 110 MMHG | WEIGHT: 135.2 LBS | OXYGEN SATURATION: 100 % | HEART RATE: 75 BPM | RESPIRATION RATE: 18 BRPM | DIASTOLIC BLOOD PRESSURE: 72 MMHG | TEMPERATURE: 98.1 F | HEIGHT: 68 IN | BODY MASS INDEX: 20.49 KG/M2

## 2024-05-02 DIAGNOSIS — L70.8 OTHER ACNE: ICD-10-CM

## 2024-05-02 DIAGNOSIS — Z12.11 COLON CANCER SCREENING: ICD-10-CM

## 2024-05-02 DIAGNOSIS — Z00.00 ROUTINE GENERAL MEDICAL EXAMINATION AT A HEALTH CARE FACILITY: Primary | ICD-10-CM

## 2024-05-02 DIAGNOSIS — Z12.31 ENCOUNTER FOR SCREENING MAMMOGRAM FOR MALIGNANT NEOPLASM OF BREAST: ICD-10-CM

## 2024-05-02 PROCEDURE — 99396 PREV VISIT EST AGE 40-64: CPT | Performed by: PHYSICIAN ASSISTANT

## 2024-05-02 RX ORDER — TRIAMCINOLONE ACETONIDE 1 MG/G
CREAM TOPICAL 2 TIMES DAILY PRN
Qty: 80 G | Refills: 11 | Status: SHIPPED | OUTPATIENT
Start: 2024-05-02

## 2024-05-02 ASSESSMENT — PAIN SCALES - GENERAL: PAINLEVEL: NO PAIN (0)

## 2024-05-02 NOTE — PROGRESS NOTES
Assessment & Plan   Problem List Items Addressed This Visit          Musculoskeletal and Integumentary    Other acne    Relevant Medications    triamcinolone (KENALOG) 0.1 % external cream     Other Visit Diagnoses       Routine general medical examination at a health care facility    -  Primary    Colon cancer screening        Relevant Orders    COLOGUARD([employer])    Encounter for screening mammogram for malignant neoplasm of breast        Relevant Orders    MA Screen Bilateral w/[place]           Ordered mammogram for breast cancer screening.    Ordered Cologuard for colon cancer screening.    Refilled triamcinolone cream to use as needed for skin irritation on the hands.  Gave side effect profile.  Continue to use lotions frequently as needed.    Gave patient education.  Repeat physical in 1 year.    Patient has been advised of split billing requirements and indicates understanding: Yes  Prescription drug management       Counseling  Appropriate preventive services were discussed with this patient, including applicable screening as appropriate for fall prevention, nutrition, physical activity, Tobacco-use cessation, weight loss and cognition.  Checklist reviewing preventive services available has been given to the patient.  Reviewed patient's diet, addressing concerns and/or questions.   She is at risk for lack of exercise and has been provided with information to increase physical activity for the benefit of her well-being.   She is at risk for psychosocial distress and has been provided with information to reduce risk.     See Patient Instructions    Return in about 53 weeks (around 5/8/2025) for Annual Wellness Visit.      Leilani Yañez is a 43 year old, presenting for the following health issues:  Physical        5/2/2024     2:59 PM   Additional Questions   Roomed by Jessica LION   Accompanied by self     HPI     Patient's last menstrual period was 04/20/2024 (exact date).   Contraception:  "vasectomy  Risk for STI?: none  Last pap: 2023 - normal pap and HPV  Any hx of abnormal paps:  none  FH of early CA?: no  Cholesterol/DM concerns/screenin  Tobacco?: no  Lung cancer screening: na  Calcium intake: yes  DEXA: na  Last mammo: 2023, ordered repeat  Colonoscopy: none, ordered Cologuard  Hepatitis C screen: none, declines  HIV screen: none, declines  Immunizations: UTD    Patient has history of eczema on her hands.  Has flared over the last 6 to 9 months.  Out of her steroid cream.  Needs refill.  Tolerates medication well.  No side effects noted.  Eczema on hands.  Flaired x 6-9 months. Hands.      Review of Systems  Constitutional, neuro, ENT, endocrine, pulmonary, cardiac, gastrointestinal, genitourinary, musculoskeletal, integument and psychiatric systems are negative, except as otherwise noted.      Objective    /72 (BP Location: Right arm, Patient Position: Sitting, Cuff Size: Adult Regular)   Pulse 75   Temp 98.1  F (36.7  C) (Tympanic)   Resp 18   Ht 1.721 m (5' 7.75\")   Wt 61.3 kg (135 lb 3.2 oz)   LMP 2024 (Exact Date)   SpO2 100%   BMI 20.71 kg/m    Body mass index is 20.71 kg/m .  Physical Exam   GENERAL: alert and no distress  EYES: Eyes grossly normal to inspection, PERRL and conjunctivae and sclerae normal  HENT: ear canals and TM's normal, nose and mouth without ulcers or lesions  NECK: no adenopathy, no asymmetry, masses, or scars  RESP: lungs clear to auscultation - no rales, rhonchi or wheezes  CV: regular rate and rhythm, normal S1 S2, no S3 or S4, no murmur, click or rub, no peripheral edema  ABDOMEN: soft, nontender, no hepatosplenomegaly, no masses and bowel sounds normal  MS: no gross musculoskeletal defects noted, no edema  SKIN: no suspicious lesions or rashes.  Minimally erythematous dry skin appreciated scattered throughout the hands.   NEURO: Normal strength and tone, mentation intact and speech normal  PSYCH: mentation appears normal, " affect normal/bright    No results found for any visits on 05/02/24.        Signed Electronically by: Yenny Licona PA-C

## 2024-05-02 NOTE — NURSING NOTE
"Chief Complaint   Patient presents with    Physical       Initial /72 (BP Location: Right arm, Patient Position: Sitting, Cuff Size: Adult Regular)   Pulse 75   Temp 98.1  F (36.7  C) (Tympanic)   Resp 18   Ht 1.721 m (5' 7.75\")   Wt 61.3 kg (135 lb 3.2 oz)   LMP 04/20/2024 (Exact Date)   SpO2 100%   BMI 20.71 kg/m   Estimated body mass index is 20.71 kg/m  as calculated from the following:    Height as of this encounter: 1.721 m (5' 7.75\").    Weight as of this encounter: 61.3 kg (135 lb 3.2 oz).  Medication Review: complete    The next two questions are to help us understand your food security.  If you are feeling you need any assistance in this area, we have resources available to support you today.          5/1/2024   SDOH- Food Insecurity   Within the past 12 months, did you worry that your food would run out before you got money to buy more? N   Within the past 12 months, did the food you bought just not last and you didn t have money to get more? N         Health Care Directive:  Patient does not have a Health Care Directive or Living Will: Discussed advance care planning with patient; however, patient declined at this time.    Jessica Newell      "

## 2024-05-02 NOTE — PATIENT INSTRUCTIONS
Cologuard Patient Instructions    You received an order for a Cologuard test. You will receive your kit in the mail. Please follow the instructions that are provided in the kit.      Reminder: Ship Cologuard test the same day or the next day to allow enough delivery time. The lab must receive your specimen within 4 days for successful testing. If not delivered in time, you may have to complete the process again.    Home Pick-up:  Once you complete the kit, call [employer] at [phone] and they will schedule a UPS pickup for you.    OR    Drop Off Locations:  Once you have completed the collection and have it ready to be shipped, bring it to one of the following sites listed below. *Note: none of the sites ship out later than mid-morning. Please do not drop off Fridays, as they will not go out until Monday which will make your specimen inacceptable.     - [facility] ([address], [place] [address])      Drop off: Monday-Thursday, 8:00am-4:30pm at the [address] check-in desk      - [facility] ([address], [place] [address])   Drop off: Monday-Thursday, 8:00am-5:45pm     - [facility] ([address], [place] [address])     Drop off: Monday-Thursday, 3:00pm-5:30pm     Preventive Care Advice   This is general advice given by our system to help you stay healthy. However, your care team may have specific advice just for you. Please talk to your care team about your preventive care needs.  Nutrition  Eat 5 or more servings of fruits and vegetables each day.  Try wheat bread, brown rice and whole grain pasta (instead of white bread, rice, and pasta).  Get enough calcium and vitamin D. Check the label on foods and aim for 100% of the RDA (recommended daily allowance).  Lifestyle  Exercise at least 150 minutes each week   (30 minutes a day, 5 days a week).  Do muscle strengthening activities 2 days a week. These help control your weight and prevent disease.  No smoking.  Wear sunscreen to  prevent skin cancer.  Have a dental exam and cleaning every 6 months.  Yearly exams  See your health care team every year to talk about:  Any changes in your health.  Any medicines your care team has prescribed.  Preventive care, family planning, and ways to prevent chronic diseases.  Shots (vaccines)   HPV shots (up to age 26), if you've never had them before.  Hepatitis B shots (up to age 59), if you've never had them before.  COVID-19 shot: Get this shot when it's due.  Flu shot: Get a flu shot every year.  Tetanus shot: Get a tetanus shot every 10 years.  Pneumococcal, hepatitis A, and RSV shots: Ask your care team if you need these based on your risk.  Shingles shot (for age 50 and up).  General health tests  Diabetes screening:  Starting at age 35, Get screened for diabetes at least every 3 years.  If you are younger than age 35, ask your care team if you should be screened for diabetes.  Cholesterol test: At age 39, start having a cholesterol test every 5 years, or more often if advised.  Bone density scan (DEXA): At age 50, ask your care team if you should have this scan for osteoporosis (brittle bones).  Hepatitis C: Get tested at least once in your life.  STIs (sexually transmitted infections)  Before age 24: Ask your care team if you should be screened for STIs.  After age 24: Get screened for STIs if you're at risk. You are at risk for STIs (including HIV) if:  You are sexually active with more than one person.  You don't use condoms every time.  You or a partner was diagnosed with a sexually transmitted infection.  If you are at risk for HIV, ask about PrEP medicine to prevent HIV.  Get tested for HIV at least once in your life, whether you are at risk for HIV or not.  Cancer screening tests  Cervical cancer screening: If you have a cervix, begin getting regular cervical cancer screening tests at age 21. Most people who have regular screenings with normal results can stop after age 65. Talk about this  with your provider.  Breast cancer scan (mammogram): If you've ever had breasts, begin having regular mammograms starting at age 40. This is a scan to check for breast cancer.  Colon cancer screening: It is important to start screening for colon cancer at age 45.  Have a colonoscopy test every 10 years (or more often if you're at risk) Or, ask your provider about stool tests like a FIT test every year or Cologuard test every 3 years.  To learn more about your testing options, visit: https://www.PBworks/892155.pdf.  For help making a decision, visit: https://bit.ly/vf97380.  Prostate cancer screening test: If you have a prostate and are age 55 to 69, ask your provider if you would benefit from a yearly prostate cancer screening test.  Lung cancer screening: If you are a current or former smoker age 50 to 80, ask your care team if ongoing lung cancer screenings are right for you.  For informational purposes only. Not to replace the advice of your health care provider. Copyright   2023 [facility]. All rights reserved. Clinically reviewed by the [facility] Transitions Program. Lydia [id number] - REV 01/24.    Eating Healthy Foods: Care Instructions  With every meal, you can make healthy food choices. Try to eat a variety of fruits, vegetables, whole grains, lean proteins, and low-fat dairy products. This can help you get the right balance of nutrients, including vitamins and minerals. Small changes add up over time. You can start by adding one healthy food to your meals each day.    Try to make half your plate fruits and vegetables, one-fourth whole grains, and one-fourth lean proteins. Try including dairy with your meals.   Eat more fruits and vegetables. Try to have them with most meals and snacks.   Foods for healthy eating    Fruits    These can be fresh, frozen, canned, or dried.  Try to choose whole fruit rather than fruit juice.  Eat a variety of colors.    Vegetables    These can be  "fresh, frozen, canned, or dried.  Beans, peas, and lentils count too.    Whole grains    Choose whole-grain breads, cereals, and noodles.  Try brown rice.    Lean proteins    These can include lean meat, poultry, fish, and eggs.  You can also have tofu, beans, peas, lentils, nuts, and seeds.    Dairy    Try milk, yogurt, and cheese.  Choose low-fat or fat-free when you can.  If you need to, use lactose-free milk or fortified plant-based milk products, such as soy milk.    Water    Drink water when you're thirsty.  Limit sugar-sweetened drinks, including soda, fruit drinks, and sports drinks.  Where can you learn more?  Go to https://www.[employer].net/patiented  Enter T756 in the search box to learn more about \"Eating Healthy Foods: Care Instructions.\"  Current as of: September 20, 2023               Content Version: 14.0    4497-3113 [employer].   Care instructions adapted under license by your healthcare professional. If you have questions about a medical condition or this instruction, always ask your healthcare professional. [employer] disclaims any warranty or liability for your use of this information.      Learning About Stress  What is stress?     Stress is your body's response to a hard situation. Your body can have a physical, emotional, or mental response. Stress is a fact of life for most people, and it affects everyone differently. What causes stress for you may not be stressful for someone else.  A lot of things can cause stress. You may feel stress when you go on a job interview, take a test, or run a race. This kind of short-term stress is normal and even useful. It can help you if you need to work hard or react quickly. For example, stress can help you finish an important job on time.  Long-term stress is caused by ongoing stressful situations or events. Examples of long-term stress include long-term health problems, ongoing problems at work, or conflicts in your family. " Long-term stress can harm your health.  How does stress affect your health?  When you are stressed, your body responds as though you are in danger. It makes hormones that speed up your heart, make you breathe faster, and give you a burst of energy. This is called the fight-or-flight stress response. If the stress is over quickly, your body goes back to normal and no harm is done.  But if stress happens too often or lasts too long, it can have bad effects. Long-term stress can make you more likely to get sick, and it can make symptoms of some diseases worse. If you tense up when you are stressed, you may develop neck, shoulder, or low back pain. Stress is linked to high blood pressure and heart disease.  Stress also harms your emotional health. It can make you lewis, tense, or depressed. Your relationships may suffer, and you may not do well at work or school.  What can you do to manage stress?  You can try these things to help manage stress:   Do something active. Exercise or activity can help reduce stress. Walking is a great way to get started. Even everyday activities such as housecleaning or yard work can help.  Try yoga or abraham chi. These techniques combine exercise and meditation. You may need some training at first to learn them.  Do something you enjoy. For example, listen to music or go to a movie. Practice your hobby or do volunteer work.  Meditate. This can help you relax, because you are not worrying about what happened before or what may happen in the future.  Do guided imagery. Imagine yourself in any setting that helps you feel calm. You can use online videos, books, or a teacher to guide you.  Do breathing exercises. For example:  From a standing position, bend forward from the waist with your knees slightly bent. Let your arms dangle close to the floor.  Breathe in slowly and deeply as you return to a standing position. Roll up slowly and lift your head last.  Hold your breath for just a few seconds  "in the standing position.  Breathe out slowly and bend forward from the waist.  Let your feelings out. Talk, laugh, cry, and express anger when you need to. Talking with supportive friends or family, a counselor, or a ashley leader about your feelings is a healthy way to relieve stress. Avoid discussing your feelings with people who make you feel worse.  Write. It may help to write about things that are bothering you. This helps you find out how much stress you feel and what is causing it. When you know this, you can find better ways to cope.  What can you do to prevent stress?  You might try some of these things to help prevent stress:  Manage your time. This helps you find time to do the things you want and need to do.  Get enough sleep. Your body recovers from the stresses of the day while you are sleeping.  Get support. Your family, friends, and community can make a difference in how you experience stress.  Limit your news feed. Avoid or limit time on social media or news that may make you feel stressed.  Do something active. Exercise or activity can help reduce stress. Walking is a great way to get started.  Where can you learn more?  Go to https://www.[employer].net/patiented  Enter N032 in the search box to learn more about \"Learning About Stress.\"  Current as of: October 24, 2023               Content Version: 14.0    0027-1925 [employer].   Care instructions adapted under license by your healthcare professional. If you have questions about a medical condition or this instruction, always ask your healthcare professional. [employer] disclaims any warranty or liability for your use of this information.      "

## 2024-05-28 LAB — NONINV COLON CA DNA+OCC BLD SCRN STL QL: NEGATIVE

## 2024-06-05 ENCOUNTER — HOSPITAL ENCOUNTER (OUTPATIENT)
Dept: MAMMOGRAPHY | Facility: OTHER | Age: 44
Discharge: HOME | End: 2024-06-05
Attending: PHYSICIAN ASSISTANT | Admitting: PHYSICIAN ASSISTANT
Payer: COMMERCIAL

## 2024-06-05 DIAGNOSIS — Z12.31 ENCOUNTER FOR SCREENING MAMMOGRAM FOR MALIGNANT NEOPLASM OF BREAST: ICD-10-CM

## 2024-06-05 PROCEDURE — 77063 BREAST TOMOSYNTHESIS BI: CPT

## 2024-08-27 ENCOUNTER — HOSPITAL ENCOUNTER (OUTPATIENT)
Dept: GENERAL RADIOLOGY | Facility: OTHER | Age: 44
Discharge: HOME | End: 2024-08-27
Attending: NURSE PRACTITIONER
Payer: COMMERCIAL

## 2024-08-27 ENCOUNTER — OFFICE VISIT (OUTPATIENT)
Dept: FAMILY MEDICINE | Facility: OTHER | Age: 44
End: 2024-08-27
Attending: NURSE PRACTITIONER
Payer: COMMERCIAL

## 2024-08-27 VITALS
TEMPERATURE: 98.1 F | HEART RATE: 84 BPM | BODY MASS INDEX: 19.85 KG/M2 | DIASTOLIC BLOOD PRESSURE: 72 MMHG | SYSTOLIC BLOOD PRESSURE: 116 MMHG | OXYGEN SATURATION: 100 % | RESPIRATION RATE: 16 BRPM | HEIGHT: 68 IN | WEIGHT: 131 LBS

## 2024-08-27 DIAGNOSIS — M79.674 PAIN OF TOE OF RIGHT FOOT: ICD-10-CM

## 2024-08-27 DIAGNOSIS — S90.221A CONTUSION OF RIGHT LESSER TOE(S) WITH DAMAGE TO NAIL, INITIAL ENCOUNTER: ICD-10-CM

## 2024-08-27 DIAGNOSIS — M79.674 PAIN OF TOE OF RIGHT FOOT: Primary | ICD-10-CM

## 2024-08-27 PROCEDURE — 73660 X-RAY EXAM OF TOE(S): CPT | Mod: RT

## 2024-08-27 PROCEDURE — 99213 OFFICE O/P EST LOW 20 MIN: CPT | Performed by: NURSE PRACTITIONER

## 2024-08-27 ASSESSMENT — PAIN SCALES - GENERAL: PAINLEVEL: MILD PAIN (2)

## 2024-08-27 NOTE — PROGRESS NOTES
"  Assessment & Plan   Problem List Items Addressed This Visit    None  Visit Diagnoses       Contusion of left lesser toe(s) with damage to nail, initial encounter    -  Primary    Pain of toe of right foot        Relevant Orders    XR Toe Right G/E 2 Views (Completed)           Injury to right second toe over the weekend, pain has improved continues have some numbness and bruising.  X-ray was obtained, no acute fracture.  Discussed contusion, symptomatic management, she likely is going to lose toenail at some point.  Follow-up as needed.        No follow-ups on file.      Leilani Yañez is a 44 year old, presenting for the following health issues:  Toe Injury    History of Present Illness       Reason for visit:  Toe injury  Symptom onset:  1-3 days ago   She is taking medications regularly.     She presents to clinic today for evaluation of rightsecond toe pain.  Over the weekend she was pulling something out of her cupboard, a large can of limiting next fell, aluminum written landed on her second and third toes.  Over the weekend she had significant amount of pain and numbness.  She has noticed some swelling to the distal portion of her second toe.  She has been elevating this over the weekend.  She does feel less pain today but continues to have a numb sensation, bruising to the base of the toenail.          Objective    /72   Pulse 84   Temp 98.1  F (36.7  C)   Resp 16   Ht 1.721 m (5' 7.75\")   Wt 59.4 kg (131 lb)   SpO2 100%   BMI 20.07 kg/m    Body mass index is 20.07 kg/m .  Physical Exam   GENERAL: alert and no distress  MS: right second toe tender to palpation, mild swelling, bruising and small blister like appearance to base of toenail  SKIN: no suspicious lesions or rashes  NEURO: Normal strength and tone, mentation intact and speech normal  PSYCH: mentation appears normal, affect normal/bright    Results for orders placed or performed during the hospital encounter of 08/27/24   XR Toe " Right G/E 2 Views     Status: None    Narrative    Exam: XR TOE RIGHT G/E 2 VIEWS     History:Female, age 44 years, Pain of toe of right foot    Comparison:  No relevant prior imaging.    Technique: Three views are submitted.    Findings: Bones are normally mineralized. No evidence of acute or  subacute fracture.  No evidence of dislocation.           Impression    Impression:  No evidence of acute or subacute bony abnormality.    ALYSSA GAMBOA MD         SYSTEM ID:  [id number]             Signed Electronically by: ZENAIDA Felder CNP

## 2024-08-27 NOTE — NURSING NOTE
Patient presents today for injury to toes on her right foot.    Medication Reconciliation Complete    Marilin Castaneda LPN  8/27/2024 1:34 PM

## 2025-06-07 ENCOUNTER — HEALTH MAINTENANCE LETTER (OUTPATIENT)
Age: 45
End: 2025-06-07